# Patient Record
Sex: MALE | Race: WHITE | Employment: UNEMPLOYED | URBAN - METROPOLITAN AREA
[De-identification: names, ages, dates, MRNs, and addresses within clinical notes are randomized per-mention and may not be internally consistent; named-entity substitution may affect disease eponyms.]

---

## 2021-05-13 ENCOUNTER — APPOINTMENT (OUTPATIENT)
Dept: CT IMAGING | Age: 66
DRG: 063 | End: 2021-05-13
Payer: MEDICARE

## 2021-05-13 ENCOUNTER — HOSPITAL ENCOUNTER (OUTPATIENT)
Age: 66
Setting detail: SPECIMEN
Discharge: HOME | End: 2021-05-13
Payer: MEDICARE

## 2021-05-13 ENCOUNTER — HOSPITAL ENCOUNTER (INPATIENT)
Age: 66
LOS: 2 days | Discharge: HOME | DRG: 063 | End: 2021-05-15
Attending: EMERGENCY MEDICINE | Admitting: PSYCHIATRY & NEUROLOGY
Payer: MEDICARE

## 2021-05-13 DIAGNOSIS — Z92.82: Primary | ICD-10-CM

## 2021-05-13 DIAGNOSIS — I63.9 CEREBROVASCULAR ACCIDENT (CVA), UNSPECIFIED MECHANISM (HCC): ICD-10-CM

## 2021-05-13 LAB
% CKMB: 5.3 % (ref 0–3.5)
ABSOLUTE EOS #: 0.15 K/UL (ref 0–0.44)
ABSOLUTE IMMATURE GRANULOCYTE: <0.03 K/UL (ref 0–0.3)
ABSOLUTE LYMPH #: 1.15 K/UL (ref 1.1–3.7)
ABSOLUTE MONO #: 0.22 K/UL (ref 0.1–1.2)
ANION GAP SERPL CALCULATED.3IONS-SCNC: 13 MMOL/L (ref 9–17)
BASOPHILS # BLD: 1 % (ref 0–2)
BASOPHILS ABSOLUTE: <0.03 K/UL (ref 0–0.2)
BUN BLDV-MCNC: 21 MG/DL (ref 8–23)
BUN/CREAT BLD: ABNORMAL (ref 9–20)
CALCIUM SERPL-MCNC: 8.8 MG/DL (ref 8.6–10.4)
CHLORIDE BLD-SCNC: 104 MMOL/L (ref 98–107)
CK MB: 6.2 NG/ML
CKMB INTERPRETATION: ABNORMAL
CO2: 21 MMOL/L (ref 20–31)
CREAT SERPL-MCNC: 0.93 MG/DL (ref 0.7–1.2)
DIFFERENTIAL TYPE: ABNORMAL
EOSINOPHILS RELATIVE PERCENT: 5 % (ref 1–4)
GFR AFRICAN AMERICAN: >60 ML/MIN
GFR NON-AFRICAN AMERICAN: >60 ML/MIN
GFR SERPL CREATININE-BSD FRML MDRD: ABNORMAL ML/MIN/{1.73_M2}
GFR SERPL CREATININE-BSD FRML MDRD: ABNORMAL ML/MIN/{1.73_M2}
GLUCOSE BLD-MCNC: 92 MG/DL (ref 70–99)
HCT VFR BLD CALC: 42.1 % (ref 40.7–50.3)
HEMOGLOBIN: 13.8 G/DL (ref 13–17)
IMMATURE GRANULOCYTES: 1 %
INR BLD: 0.9
LYMPHOCYTES # BLD: 41 % (ref 24–43)
MCH RBC QN AUTO: 35.4 PG (ref 25.2–33.5)
MCHC RBC AUTO-ENTMCNC: 32.8 G/DL (ref 28.4–34.8)
MCV RBC AUTO: 107.9 FL (ref 82.6–102.9)
MONOCYTES # BLD: 8 % (ref 3–12)
MYOGLOBIN: 50 NG/ML (ref 28–72)
NRBC AUTOMATED: 0 PER 100 WBC
PARTIAL THROMBOPLASTIN TIME: 24.1 SEC (ref 20.5–30.5)
PDW BLD-RTO: 12.6 % (ref 11.8–14.4)
PLATELET # BLD: 171 K/UL (ref 138–453)
PLATELET ESTIMATE: ABNORMAL
PMV BLD AUTO: 9.6 FL (ref 8.1–13.5)
POTASSIUM SERPL-SCNC: 4.1 MMOL/L (ref 3.7–5.3)
PROTHROMBIN TIME: 9.7 SEC (ref 9.1–12.3)
RBC # BLD: 3.9 M/UL (ref 4.21–5.77)
RBC # BLD: ABNORMAL 10*6/UL
SARS-COV-2, RAPID: NOT DETECTED
SEG NEUTROPHILS: 44 % (ref 36–65)
SEGMENTED NEUTROPHILS ABSOLUTE COUNT: 1.23 K/UL (ref 1.5–8.1)
SODIUM BLD-SCNC: 138 MMOL/L (ref 135–144)
SPECIMEN DESCRIPTION: NORMAL
TOTAL CK: 117 U/L (ref 39–308)
TROPONIN INTERP: ABNORMAL
TROPONIN T: ABNORMAL NG/ML
TROPONIN, HIGH SENSITIVITY: 22 NG/L (ref 0–22)
WBC # BLD: 2.8 K/UL (ref 3.5–11.3)
WBC # BLD: ABNORMAL 10*3/UL

## 2021-05-13 PROCEDURE — 6360000004 HC RX CONTRAST MEDICATION: Performed by: STUDENT IN AN ORGANIZED HEALTH CARE EDUCATION/TRAINING PROGRAM

## 2021-05-13 PROCEDURE — 85730 THROMBOPLASTIN TIME PARTIAL: CPT

## 2021-05-13 PROCEDURE — 85025 COMPLETE CBC W/AUTO DIFF WBC: CPT

## 2021-05-13 PROCEDURE — A0427 ALS1-EMERGENCY: HCPCS

## 2021-05-13 PROCEDURE — 96376 TX/PRO/DX INJ SAME DRUG ADON: CPT

## 2021-05-13 PROCEDURE — 70450 CT HEAD/BRAIN W/O DYE: CPT

## 2021-05-13 PROCEDURE — 82553 CREATINE MB FRACTION: CPT

## 2021-05-13 PROCEDURE — 85610 PROTHROMBIN TIME: CPT

## 2021-05-13 PROCEDURE — 2000000003 HC NEURO ICU R&B

## 2021-05-13 PROCEDURE — 6360000002 HC RX W HCPCS: Performed by: STUDENT IN AN ORGANIZED HEALTH CARE EDUCATION/TRAINING PROGRAM

## 2021-05-13 PROCEDURE — 99283 EMERGENCY DEPT VISIT LOW MDM: CPT

## 2021-05-13 PROCEDURE — A0425 GROUND MILEAGE: HCPCS

## 2021-05-13 PROCEDURE — 87635 SARS-COV-2 COVID-19 AMP PRB: CPT

## 2021-05-13 PROCEDURE — 70496 CT ANGIOGRAPHY HEAD: CPT

## 2021-05-13 PROCEDURE — 93005 ELECTROCARDIOGRAM TRACING: CPT | Performed by: STUDENT IN AN ORGANIZED HEALTH CARE EDUCATION/TRAINING PROGRAM

## 2021-05-13 PROCEDURE — 96365 THER/PROPH/DIAG IV INF INIT: CPT

## 2021-05-13 PROCEDURE — 3E03317 INTRODUCTION OF OTHER THROMBOLYTIC INTO PERIPHERAL VEIN, PERCUTANEOUS APPROACH: ICD-10-PCS | Performed by: STUDENT IN AN ORGANIZED HEALTH CARE EDUCATION/TRAINING PROGRAM

## 2021-05-13 PROCEDURE — 99223 1ST HOSP IP/OBS HIGH 75: CPT | Performed by: PSYCHIATRY & NEUROLOGY

## 2021-05-13 PROCEDURE — 2500000003 HC RX 250 WO HCPCS: Performed by: STUDENT IN AN ORGANIZED HEALTH CARE EDUCATION/TRAINING PROGRAM

## 2021-05-13 PROCEDURE — 82565 ASSAY OF CREATININE: CPT

## 2021-05-13 PROCEDURE — 84484 ASSAY OF TROPONIN QUANT: CPT

## 2021-05-13 PROCEDURE — 83874 ASSAY OF MYOGLOBIN: CPT

## 2021-05-13 PROCEDURE — 96375 TX/PRO/DX INJ NEW DRUG ADDON: CPT

## 2021-05-13 PROCEDURE — 6360000002 HC RX W HCPCS: Performed by: EMERGENCY MEDICINE

## 2021-05-13 PROCEDURE — 82550 ASSAY OF CK (CPK): CPT

## 2021-05-13 PROCEDURE — 80048 BASIC METABOLIC PNL TOTAL CA: CPT

## 2021-05-13 PROCEDURE — 2580000003 HC RX 258: Performed by: STUDENT IN AN ORGANIZED HEALTH CARE EDUCATION/TRAINING PROGRAM

## 2021-05-13 RX ORDER — METHYLPREDNISOLONE SODIUM SUCCINATE 125 MG/2ML
125 INJECTION, POWDER, LYOPHILIZED, FOR SOLUTION INTRAMUSCULAR; INTRAVENOUS ONCE
Status: COMPLETED | OUTPATIENT
Start: 2021-05-13 | End: 2021-05-13

## 2021-05-13 RX ORDER — DIPHENHYDRAMINE HYDROCHLORIDE 50 MG/ML
25 INJECTION INTRAMUSCULAR; INTRAVENOUS ONCE
Status: COMPLETED | OUTPATIENT
Start: 2021-05-13 | End: 2021-05-13

## 2021-05-13 RX ORDER — SODIUM CHLORIDE 9 MG/ML
INJECTION, SOLUTION INTRAVENOUS CONTINUOUS
Status: DISCONTINUED | OUTPATIENT
Start: 2021-05-13 | End: 2021-05-15

## 2021-05-13 RX ORDER — 0.9 % SODIUM CHLORIDE 0.9 %
50 INTRAVENOUS SOLUTION INTRAVENOUS ONCE
Status: DISCONTINUED | OUTPATIENT
Start: 2021-05-13 | End: 2021-05-15 | Stop reason: HOSPADM

## 2021-05-13 RX ADMIN — METHYLPREDNISOLONE SODIUM SUCCINATE 125 MG: 125 INJECTION, POWDER, FOR SOLUTION INTRAMUSCULAR; INTRAVENOUS at 23:36

## 2021-05-13 RX ADMIN — SODIUM CHLORIDE 1000 ML: 9 INJECTION, SOLUTION INTRAVENOUS at 22:00

## 2021-05-13 RX ADMIN — DIPHENHYDRAMINE HYDROCHLORIDE 25 MG: 50 INJECTION, SOLUTION INTRAMUSCULAR; INTRAVENOUS at 23:36

## 2021-05-13 RX ADMIN — IOPAMIDOL 90 ML: 755 INJECTION, SOLUTION INTRAVENOUS at 23:22

## 2021-05-13 RX ADMIN — MIDAZOLAM HYDROCHLORIDE 2 MG: 1 INJECTION, SOLUTION INTRAMUSCULAR; INTRAVENOUS at 21:25

## 2021-05-13 RX ADMIN — ALTEPLASE 80.8 MG: KIT at 21:54

## 2021-05-13 RX ADMIN — MIDAZOLAM HYDROCHLORIDE 3 MG: 1 INJECTION, SOLUTION INTRAMUSCULAR; INTRAVENOUS at 21:30

## 2021-05-13 RX ADMIN — ALTEPLASE 9 MG: KIT at 21:53

## 2021-05-13 RX ADMIN — FAMOTIDINE 20 MG: 10 INJECTION, SOLUTION INTRAVENOUS at 23:36

## 2021-05-14 ENCOUNTER — APPOINTMENT (OUTPATIENT)
Dept: CT IMAGING | Age: 66
DRG: 063 | End: 2021-05-14
Payer: MEDICARE

## 2021-05-14 LAB
ABSOLUTE EOS #: 0.04 K/UL (ref 0–0.44)
ABSOLUTE IMMATURE GRANULOCYTE: 0 K/UL (ref 0–0.3)
ABSOLUTE LYMPH #: 0.46 K/UL (ref 1.1–3.7)
ABSOLUTE MONO #: 0.14 K/UL (ref 0.1–1.2)
ANION GAP SERPL CALCULATED.3IONS-SCNC: 15 MMOL/L (ref 9–17)
BASOPHILS # BLD: 0 % (ref 0–2)
BASOPHILS ABSOLUTE: 0 K/UL (ref 0–0.2)
BUN BLDV-MCNC: 18 MG/DL (ref 8–23)
BUN/CREAT BLD: ABNORMAL (ref 9–20)
CALCIUM SERPL-MCNC: 8.2 MG/DL (ref 8.6–10.4)
CHLORIDE BLD-SCNC: 108 MMOL/L (ref 98–107)
CHOLESTEROL/HDL RATIO: 2.7
CHOLESTEROL: 171 MG/DL
CO2: 14 MMOL/L (ref 20–31)
CREAT SERPL-MCNC: 0.66 MG/DL (ref 0.7–1.2)
DIFFERENTIAL TYPE: ABNORMAL
EKG ATRIAL RATE: 65 BPM
EKG Q-T INTERVAL: 400 MS
EKG QRS DURATION: 84 MS
EKG QTC CALCULATION (BAZETT): 419 MS
EKG R AXIS: 79 DEGREES
EKG T AXIS: 51 DEGREES
EKG VENTRICULAR RATE: 66 BPM
EOSINOPHILS RELATIVE PERCENT: 1 % (ref 1–4)
ESTIMATED AVERAGE GLUCOSE: 91 MG/DL
GFR AFRICAN AMERICAN: >60 ML/MIN
GFR NON-AFRICAN AMERICAN: >60 ML/MIN
GFR SERPL CREATININE-BSD FRML MDRD: ABNORMAL ML/MIN/{1.73_M2}
GFR SERPL CREATININE-BSD FRML MDRD: ABNORMAL ML/MIN/{1.73_M2}
GLUCOSE BLD-MCNC: 118 MG/DL (ref 70–99)
HBA1C MFR BLD: 4.8 % (ref 4–6)
HCT VFR BLD CALC: 45.3 % (ref 40.7–50.3)
HDLC SERPL-MCNC: 63 MG/DL
HEMOGLOBIN: 14.6 G/DL (ref 13–17)
IMMATURE GRANULOCYTES: 0 %
LDL CHOLESTEROL: 79 MG/DL (ref 0–130)
LV EF: 55 %
LVEF MODALITY: NORMAL
LYMPHOCYTES # BLD: 13 % (ref 24–43)
MCH RBC QN AUTO: 36 PG (ref 25.2–33.5)
MCHC RBC AUTO-ENTMCNC: 32.2 G/DL (ref 28.4–34.8)
MCV RBC AUTO: 111.6 FL (ref 82.6–102.9)
MONOCYTES # BLD: 4 % (ref 3–12)
MORPHOLOGY: ABNORMAL
NRBC AUTOMATED: 0 PER 100 WBC
PDW BLD-RTO: 12.6 % (ref 11.8–14.4)
PLATELET # BLD: 194 K/UL (ref 138–453)
PLATELET ESTIMATE: ABNORMAL
PMV BLD AUTO: 9.6 FL (ref 8.1–13.5)
POTASSIUM SERPL-SCNC: 4.8 MMOL/L (ref 3.7–5.3)
RBC # BLD: 4.06 M/UL (ref 4.21–5.77)
RBC # BLD: ABNORMAL 10*6/UL
SEG NEUTROPHILS: 82 % (ref 36–65)
SEGMENTED NEUTROPHILS ABSOLUTE COUNT: 2.86 K/UL (ref 1.5–8.1)
SODIUM BLD-SCNC: 137 MMOL/L (ref 135–144)
TRIGL SERPL-MCNC: 145 MG/DL
VLDLC SERPL CALC-MCNC: NORMAL MG/DL (ref 1–30)
WBC # BLD: 3.5 K/UL (ref 3.5–11.3)
WBC # BLD: ABNORMAL 10*3/UL

## 2021-05-14 PROCEDURE — 2580000003 HC RX 258: Performed by: STUDENT IN AN ORGANIZED HEALTH CARE EDUCATION/TRAINING PROGRAM

## 2021-05-14 PROCEDURE — 70491 CT SOFT TISSUE NECK W/DYE: CPT

## 2021-05-14 PROCEDURE — 92610 EVALUATE SWALLOWING FUNCTION: CPT

## 2021-05-14 PROCEDURE — 6360000002 HC RX W HCPCS: Performed by: PSYCHIATRY & NEUROLOGY

## 2021-05-14 PROCEDURE — 6370000000 HC RX 637 (ALT 250 FOR IP): Performed by: PSYCHIATRY & NEUROLOGY

## 2021-05-14 PROCEDURE — 2580000003 HC RX 258: Performed by: PSYCHIATRY & NEUROLOGY

## 2021-05-14 PROCEDURE — 99223 1ST HOSP IP/OBS HIGH 75: CPT | Performed by: PSYCHIATRY & NEUROLOGY

## 2021-05-14 PROCEDURE — 83036 HEMOGLOBIN GLYCOSYLATED A1C: CPT

## 2021-05-14 PROCEDURE — 80061 LIPID PANEL: CPT

## 2021-05-14 PROCEDURE — 6370000000 HC RX 637 (ALT 250 FOR IP): Performed by: STUDENT IN AN ORGANIZED HEALTH CARE EDUCATION/TRAINING PROGRAM

## 2021-05-14 PROCEDURE — 92523 SPEECH SOUND LANG COMPREHEN: CPT

## 2021-05-14 PROCEDURE — 6360000004 HC RX CONTRAST MEDICATION: Performed by: STUDENT IN AN ORGANIZED HEALTH CARE EDUCATION/TRAINING PROGRAM

## 2021-05-14 PROCEDURE — 93306 TTE W/DOPPLER COMPLETE: CPT

## 2021-05-14 PROCEDURE — 85025 COMPLETE CBC W/AUTO DIFF WBC: CPT

## 2021-05-14 PROCEDURE — 80048 BASIC METABOLIC PNL TOTAL CA: CPT

## 2021-05-14 PROCEDURE — 97165 OT EVAL LOW COMPLEX 30 MIN: CPT

## 2021-05-14 PROCEDURE — 94761 N-INVAS EAR/PLS OXIMETRY MLT: CPT

## 2021-05-14 PROCEDURE — 97535 SELF CARE MNGMENT TRAINING: CPT

## 2021-05-14 PROCEDURE — 2000000003 HC NEURO ICU R&B

## 2021-05-14 PROCEDURE — 36415 COLL VENOUS BLD VENIPUNCTURE: CPT

## 2021-05-14 RX ORDER — TOPIRAMATE 25 MG/1
50 TABLET ORAL 2 TIMES DAILY
Status: DISCONTINUED | OUTPATIENT
Start: 2021-05-14 | End: 2021-05-15 | Stop reason: HOSPADM

## 2021-05-14 RX ORDER — PRIMIDONE 250 MG/1
250 TABLET ORAL 2 TIMES DAILY
COMMUNITY

## 2021-05-14 RX ORDER — GAUZE BANDAGE 2" X 2"
100 BANDAGE TOPICAL DAILY
Status: DISCONTINUED | OUTPATIENT
Start: 2021-05-17 | End: 2021-05-15 | Stop reason: HOSPADM

## 2021-05-14 RX ORDER — MIDAZOLAM HYDROCHLORIDE 2 MG/2ML
2 INJECTION, SOLUTION INTRAMUSCULAR; INTRAVENOUS ONCE
Status: COMPLETED | OUTPATIENT
Start: 2021-05-14 | End: 2021-05-13

## 2021-05-14 RX ORDER — LISINOPRIL 10 MG/1
10 TABLET ORAL DAILY
COMMUNITY

## 2021-05-14 RX ORDER — SODIUM CHLORIDE 0.9 % (FLUSH) 0.9 %
5-40 SYRINGE (ML) INJECTION PRN
Status: DISCONTINUED | OUTPATIENT
Start: 2021-05-14 | End: 2021-05-15 | Stop reason: HOSPADM

## 2021-05-14 RX ORDER — SODIUM CHLORIDE 0.9 % (FLUSH) 0.9 %
5-40 SYRINGE (ML) INJECTION EVERY 12 HOURS SCHEDULED
Status: DISCONTINUED | OUTPATIENT
Start: 2021-05-14 | End: 2021-05-15 | Stop reason: HOSPADM

## 2021-05-14 RX ORDER — ATORVASTATIN CALCIUM 80 MG/1
80 TABLET, FILM COATED ORAL NIGHTLY
Status: DISCONTINUED | OUTPATIENT
Start: 2021-05-14 | End: 2021-05-15

## 2021-05-14 RX ORDER — LISINOPRIL 10 MG/1
10 TABLET ORAL DAILY
Status: DISCONTINUED | OUTPATIENT
Start: 2021-05-14 | End: 2021-05-15 | Stop reason: HOSPADM

## 2021-05-14 RX ORDER — FOLIC ACID 1 MG/1
1 TABLET ORAL DAILY
Status: DISCONTINUED | OUTPATIENT
Start: 2021-05-14 | End: 2021-05-15 | Stop reason: HOSPADM

## 2021-05-14 RX ORDER — ACETAMINOPHEN 325 MG/1
650 TABLET ORAL EVERY 6 HOURS PRN
Status: DISCONTINUED | OUTPATIENT
Start: 2021-05-14 | End: 2021-05-15 | Stop reason: HOSPADM

## 2021-05-14 RX ORDER — PROPRANOLOL HYDROCHLORIDE 40 MG/1
40 TABLET ORAL 3 TIMES DAILY
COMMUNITY

## 2021-05-14 RX ORDER — ACETAMINOPHEN 650 MG/1
650 SUPPOSITORY RECTAL EVERY 6 HOURS PRN
Status: DISCONTINUED | OUTPATIENT
Start: 2021-05-14 | End: 2021-05-15 | Stop reason: HOSPADM

## 2021-05-14 RX ORDER — POLYETHYLENE GLYCOL 3350 17 G/17G
17 POWDER, FOR SOLUTION ORAL DAILY PRN
Status: DISCONTINUED | OUTPATIENT
Start: 2021-05-14 | End: 2021-05-15 | Stop reason: HOSPADM

## 2021-05-14 RX ORDER — 0.9 % SODIUM CHLORIDE 0.9 %
1000 INTRAVENOUS SOLUTION INTRAVENOUS ONCE
Status: COMPLETED | OUTPATIENT
Start: 2021-05-14 | End: 2021-05-14

## 2021-05-14 RX ORDER — SODIUM CHLORIDE 9 MG/ML
25 INJECTION, SOLUTION INTRAVENOUS PRN
Status: DISCONTINUED | OUTPATIENT
Start: 2021-05-14 | End: 2021-05-15 | Stop reason: HOSPADM

## 2021-05-14 RX ORDER — TOPIRAMATE 25 MG/1
125 TABLET ORAL 2 TIMES DAILY
COMMUNITY
End: 2021-09-14 | Stop reason: ALTCHOICE

## 2021-05-14 RX ORDER — PROMETHAZINE HYDROCHLORIDE 12.5 MG/1
12.5 TABLET ORAL EVERY 6 HOURS PRN
Status: DISCONTINUED | OUTPATIENT
Start: 2021-05-14 | End: 2021-05-15 | Stop reason: HOSPADM

## 2021-05-14 RX ORDER — PROPRANOLOL HYDROCHLORIDE 40 MG/1
40 TABLET ORAL 3 TIMES DAILY
Status: DISCONTINUED | OUTPATIENT
Start: 2021-05-14 | End: 2021-05-15 | Stop reason: HOSPADM

## 2021-05-14 RX ORDER — MIDAZOLAM HYDROCHLORIDE 2 MG/2ML
3 INJECTION, SOLUTION INTRAMUSCULAR; INTRAVENOUS ONCE
Status: COMPLETED | OUTPATIENT
Start: 2021-05-13 | End: 2021-05-13

## 2021-05-14 RX ORDER — PRIMIDONE 250 MG/1
250 TABLET ORAL 2 TIMES DAILY
Status: DISCONTINUED | OUTPATIENT
Start: 2021-05-14 | End: 2021-05-15 | Stop reason: HOSPADM

## 2021-05-14 RX ORDER — ONDANSETRON 2 MG/ML
4 INJECTION INTRAMUSCULAR; INTRAVENOUS EVERY 6 HOURS PRN
Status: DISCONTINUED | OUTPATIENT
Start: 2021-05-14 | End: 2021-05-15 | Stop reason: HOSPADM

## 2021-05-14 RX ADMIN — PROPRANOLOL HYDROCHLORIDE 40 MG: 40 TABLET ORAL at 21:35

## 2021-05-14 RX ADMIN — TOPIRAMATE 50 MG: 25 TABLET, FILM COATED ORAL at 21:00

## 2021-05-14 RX ADMIN — PROPRANOLOL HYDROCHLORIDE 40 MG: 40 TABLET ORAL at 11:20

## 2021-05-14 RX ADMIN — LISINOPRIL 10 MG: 10 TABLET ORAL at 11:20

## 2021-05-14 RX ADMIN — SODIUM CHLORIDE, PRESERVATIVE FREE 10 ML: 5 INJECTION INTRAVENOUS at 11:21

## 2021-05-14 RX ADMIN — PRIMIDONE 250 MG: 250 TABLET ORAL at 21:00

## 2021-05-14 RX ADMIN — SODIUM CHLORIDE: 9 INJECTION, SOLUTION INTRAVENOUS at 02:35

## 2021-05-14 RX ADMIN — FOLIC ACID 1 MG: 1 TABLET ORAL at 11:20

## 2021-05-14 RX ADMIN — ATORVASTATIN CALCIUM 80 MG: 80 TABLET, FILM COATED ORAL at 21:32

## 2021-05-14 RX ADMIN — SODIUM CHLORIDE, PRESERVATIVE FREE 10 ML: 5 INJECTION INTRAVENOUS at 21:32

## 2021-05-14 RX ADMIN — THIAMINE HYDROCHLORIDE 500 MG: 100 INJECTION, SOLUTION INTRAMUSCULAR; INTRAVENOUS at 21:32

## 2021-05-14 RX ADMIN — TOPIRAMATE 50 MG: 25 TABLET, FILM COATED ORAL at 11:20

## 2021-05-14 RX ADMIN — PROPRANOLOL HYDROCHLORIDE 40 MG: 40 TABLET ORAL at 16:49

## 2021-05-14 RX ADMIN — IOPAMIDOL 75 ML: 755 INJECTION, SOLUTION INTRAVENOUS at 14:40

## 2021-05-14 RX ADMIN — SODIUM CHLORIDE: 9 INJECTION, SOLUTION INTRAVENOUS at 22:00

## 2021-05-14 RX ADMIN — THIAMINE HYDROCHLORIDE 500 MG: 100 INJECTION, SOLUTION INTRAMUSCULAR; INTRAVENOUS at 11:20

## 2021-05-14 RX ADMIN — PRIMIDONE 250 MG: 250 TABLET ORAL at 11:20

## 2021-05-14 ASSESSMENT — ENCOUNTER SYMPTOMS
CONSTIPATION: 0
ABDOMINAL PAIN: 0
CHEST TIGHTNESS: 0
COUGH: 0
VOMITING: 0
VOICE CHANGE: 1
TROUBLE SWALLOWING: 0
WHEEZING: 0
SHORTNESS OF BREATH: 0
PHOTOPHOBIA: 0
BACK PAIN: 0
DIARRHEA: 0
RHINORRHEA: 0
NAUSEA: 0

## 2021-05-14 ASSESSMENT — PAIN SCALES - GENERAL
PAINLEVEL_OUTOF10: 0
PAINLEVEL_OUTOF10: 0

## 2021-05-14 NOTE — VIRTUAL HEALTH
[facility] Stroke and Vascular Neurology Consult for  [facility] Stroke Unit Stroke Alert through [address] @ 916pm 5/13/2021 5/13/2021 9:29 PM    Pt Name: Sergei Patel  MRN: [id number]  YOB: 1955  Date of evaluation: 5/13/2021  Primary Care Physician: No primary care provider on file. Reason for Evaluation: Stroke Evaluation with Discussion with Ed or primary team with Telemedicine and stroke evaluation with Review of imaging and labs    73yo male with pmh of essential tremor, htn, pre dm. Pt presents with acute somnolence and mixed aphasia. At baseline pt has no focal deficits. LKn was 715pm 5/13/2021. Pt went to drink with friends. At around 754pm pt became somnolent, and was thought to be sleeping by friends. When the wife came to check on pt, he was hard to arouse, and had mixed aphasia, unable to follow commands. sbp 120, glu 135    Allergies  has no allergies on file. Medications  Prior to Admission medications    Not on File    Scheduled Meds:  Continuous Infusions:  PRN Meds:.  Past Medical History   has no past medical history on file.   Social History  Social History     Socioeconomic History    Marital status: Not on file     Spouse name: Not on file    Number of children: Not on file    Years of education: Not on file    Highest education level: Not on file   Occupational History    Not on file   Social Needs    Financial resource strain: Not on file    Food insecurity     Worry: Not on file     Inability: Not on file    Transportation needs     Medical: Not on file     Non-medical: Not on file   Tobacco Use    Smoking status: Not on file   Substance and Sexual Activity    Alcohol use: Not on file    Drug use: Not on file    Sexual activity: Not on file   Lifestyle    Physical activity     Days per week: Not on file     Minutes per session: Not on file    Stress: Not on file   Relationships    Social connections     Talks on phone: Not on file     Gets together: Not on file     Attends Religion service: Not on file     Active member of club or organization: Not on file     Attends meetings of clubs or organizations: Not on file     Relationship status: Not on file    Intimate partner violence     Fear of current or ex partner: Not on file     Emotionally abused: Not on file     Physically abused: Not on file     Forced sexual activity: Not on file   Other Topics Concern    Not on file   Social History Narrative    Not on file     Family History  No family history on file. OBJECTIVE  There were no vitals filed for this visit. General:   Gen: normal habitus, NAD  HEENT: NCAT, mucosa moist  Cvs: RRR, S1 S2 normal  Resp: symmetric unlabored breathing  Abd: s/nd/nt  Ext: no edema  Skin: no lesions seen, warm and dry    Neuro: exam performed after 5mg versed was given. Gen: obtunded, not oriented. Lang/speech: mute. No commands. CN: PERRL 4mm brisk, oculocephalics intact, blink to threat, mild L face droop  Motor: 2/5 all 4 ext, some voluntary spontaneous movement ue b/l. Sense: w/d and grimace to pain all 4 ext. Coord: deferred  DTR: deferred  Gait: deferred    NIH Stroke Scale:   1a  Level of consciousness: 3 - responds only with reflex motor or automatic effects or totally unresponsive, flaccid, areflexic   1b. LOC questions:  2 - answers neither question correctly   1c. LOC commands: 2 - performs neither task correctly   2. Best Gaze: 0 - normal   3. Visual: 0 - no visual loss   4. Facial Palsy: 1 - minor paralysis (flattened nasolabial fold, asymmetric on smiling)   5a. Motor left arm: 3 - no effort against gravity, limb falls   5b. Motor right arm: 3 - no effort against gravity, limb falls   6a. Motor left leg: 3 - no effort against gravity; leg falls to bed immediately   6b  Motor right leg:  3 - no effort against gravity; leg falls to bed immediately   7. Limb Ataxia: 0 - absent   8.   Sensory: 0 - normal; no sensory loss 9. Best Language:  3 - mute, global aphasia; no usable speech or auditory comprehension   10. Dysarthria: 2 - severe; patient speech is so slurred as to be unintelligible in the absence of or our of proportion to any dysphagia, or is mute/anarthric    11. Extinction and Inattention: 0 - no abnormality         Total:   26     Premorbid MRS: 0      Imaging:  Images were personally reviewed with VIZ. AI used to review images including:  CT brain without contrast: no large territory hypodensity or bleed    Assessment  71yo male with pmh of essential tremor, htn, pre dm. Pt presents with acute somnolence and mixed aphasia. CT head no bleed or large hypodensity. Pt obtunded after versed. Concern for acute cortical stroke given rapid onset. Wife Mayte Melissa [phone] updated on treatment and findings. Recommendations:  --tpa given. --on arrival to [facility], repeat CT head w con and check CTA head and neck w con. consider mt if there is lvo.  --sbp < 185  --bolus 1L NS ivf then continue 110 cc/hr    Discussed with Stroke team    At least 30 min of critical care time spent through telemedicine robot at patient bedside (via interactive/real-time software) as patient is in imminent and life threatening deterioration with further treatment and evaluation. This Virtual Visit was conducted with patient's (and/or legal guardian's) consent, to provide telestroke consultation and necessary medical care.   Time spent examining patient, reviewing the images personally, reviewing the chart, perform high complexity decision making and speaking with the nursing staff regarding recommendations      Aislinn Galvan MD PhD  Stroke, Neurocritical Care And/or [address] [place] [address] Stroke Network  [id number] [address]  Electronically signed 5/13/2021 at 9:29 PM

## 2021-05-14 NOTE — PROGRESS NOTES
Speech Language Pathology  [address]   CLINICAL BEDSIDE SWALLOW EVALUATION    NAME: Ruchi Hatfield  : 1955  MRN: [id number]    ADMISSION DATE: 2021  ADMITTING DIAGNOSIS: has Stroke aborted by administration of thrombolytic agent [facility]) on their problem list.    Date of Eval: 2021  Evaluating Therapist: Bailey Koroma    Current Diet level:  Current Diet : NPO  Current Liquid Diet : NPO      Primary Complaint: The patient is a 77 y.o. male presented with a slumped posture and poorly responsive at 7:45 PM while having drinks with friends. Last known well was 7:15 PM.  Wife went to check on him when he was poorly responsive EMS was called, and on their arrival he was combative requiring 5 mg Versed. Mobile stroke scanned his head showing no bleed. Decision to give TPA delivered at 10:45 PM.  In the emergency department patient got repeat CT head and CTA. On ED arrival NIH 4, left facial droop, severe aphasia, dysarthria. Given dysarthria and aphasia HP and past medical history extremely limited. No emergency contact or wife at bedside to answer questions. Laboratory work unremarkable. Patient on 125 mL/h NS. Pain:  Pain Assessment  RASS Score: Alert and calm    Reason for Referral  Ruchi Hatfield was referred for a bedside swallow evaluation to assess the efficiency of his swallow function, identify signs and symptoms of aspiration and make recommendations regarding safe dietary consistencies, effective compensatory strategies, and safe eating environment. Impression: Patient presents with probable safe swallow for Regular diet with thin liquids as evidenced by no overt s/s of aspiration noted with consistencies tested. Recommend small sips and bites, only feed when alert and awake and upright at 90 degrees for all PO intake.   Recommend close monitoring for overt/clinical s/s of aspiration and D/C PO intake and complete Modified Barium Swallow Study should they occur. Results and recommendations reported to RN. Dysphagia Outcome Severity Scale: Level 7: Normal in all situations     Treatment Plan  Requires SLP Intervention: Yes  Duration/Frequency of Treatment: 2-3X/week  D/C Recommendations: To be determined       Recommended Diet and Intervention  Diet Solids Recommendation: Regular  Liquid Consistency Recommendation: Thin  Recommended Form of Meds: PO     Therapeutic Interventions: Diet tolerance monitoring    Compensatory Swallowing Strategies  Compensatory Swallowing Strategies: Alternate solids and liquids;Eat/Feed slowly;Upright as possible for all oral intake;Small bites/sips    Treatment/Goals  Dysphagia Goals: The patient will tolerate recommended diet without observed clinical signs of aspiration    General  Chart Reviewed: Yes  Behavior/Cognition: Alert; Cooperative  Respiratory Status: Room air  Communication Observation: Functional  Follows Directions: Complex  Dentition: Adequate  Patient Positioning: Upright in bed  Baseline Vocal Quality: Normal(Harsh)  Consistencies Administered: Dysphagia Soft and Bite-Sized (Dysphagia III); Dysphagia Pureed (Dysphagia I); Thin - straw;Nectar - teaspoon;Nectar - straw      Vision/Hearing  Vision  Vision: Within Functional Limits  Hearing  Hearing: Within functional limits    Oral Motor Deficits  Oral/Motor  Oral Motor:  Within functional limits    Oral Phase Dysfunction  Oral Phase  Oral Phase: WFL  Oral Phase  Oral Phase - Comment: Adequate oral manipulation for consistencies, no oral loss, no oral stasis     Indicators of Pharyngeal Phase Dysfunction   Pharyngeal Phase  Pharyngeal Phase: WFL  Pharyngeal Phase   Pharyngeal: Thin, Thick, Puree and Fruit:  No overt s/s of aspiration noted with consistencies tested    Prognosis  Prognosis  Prognosis for safe diet advancement: good  Individuals consulted  Consulted and agree with results and recommendations: Patient    Education  Patient Education: yes  Patient Education Response: Verbalizes understanding             Therapy Time  SLP Individual Minutes  Time In: 6345  Time Out: 9635  Minutes: 60[address]  5/14/2021 11:24 AM

## 2021-05-14 NOTE — ED NOTES
..    5/14/2021 12:06 AM    Patient: Duane Fernando, 77 y.o., male Race:    Patient Address: No address on file. Incident Address:  []Same as patient address     [] [facility]  [] [facility]  [x] [facility]   [] [place]  [] [facility]  Patient Phone #: There are no phone numbers on file. Insurance: Payor: /   Worker's Comp:  []  Yes   [x]  No  Emergency Contact: No emergency contact information on file. MRN: [id number]   # [id number]  YOB: 1955  Primary Care Physician: No primary care provider on file. Advance Directive: [x] Full Code   []DNR-CC   []DNR-CCA    MSU Crew: LAINEY Lau Son - CT Tech, TYRELL Carpenter - Paramedic, CELIA Teixeira - RN    Pt Transported To:  [x] [place] [address]  [] [facility]  [] [facility]  [] [facility]  [] [facility]  [] [facility]  [facility] [] [facility] [] [facility]    Was patient transported to closest facility? []Yes  [x]No (if No specify reason)   []   Patient/family request    [x]   Divert to specialist       Response Code   [] 2  [x] 3  []   Change  [] 2  [] 3   Transport Code   [x] 2  [] 3  []   Change  [] 2  [] 3     Mileage:  Scene [address]   Total Miles 29.1     Call Received 5/14/2021 2035   Dispatched 5/14/2021 2035   Enroute 5/14/2021 2036   Arrived Scene 5/14/2021 2107   At Patient 5/14/2021 2110   Decision to Scan 5/14/2021 2110   Scan 5/14/2021 2127   Departed Scene 5/14/2021 2150   Arrived Hospital 5/14/2021 [address] 5/14/2021 2325   In Service 5/14/2021 2325         Allergies  [] Updated/Reviewed by MSU  [x] Historical Data Only  [] Unavailable  has no allergies on file. Medications  [] Updated/Reviewed by MSU  [x] Historical Data Only  [] Unavailable  Prior to Admission medications    Not on File      Past Medical History  [] Updated/Reviewed by MSU  [x] Historical Data Only  [] Unavailable   has no past medical history on file.     Patient Weight: 220 lbs   [x]   Estimated   []   Stated          VITALS          Time BP Pulse   Resp  Rate N-normal  S-shallow  D-deep Monitor SpO2 O2    LPM BGL   Temp Pain   2115 122/78 (Per [facility]) 67 22 S NSR 98 0 135 37 0   2130 UTO 65 18 S NSR 98 0      2145 UTO 67 14 D NSR 99 5      2200 110/80 66 12 D NSR  97 5      2215 118/78 69 10 D NSR 98 5      2219 124/78 71 14 N NSR 99 5      2229 136/81 77 16 N NSR 98 5                                   Pupils GCS   Time R L E  4 V  5 M  6 T  15   2115 2 2 4 2 5 11   2130 4 4 4 2 5 11   2145 4 4 1 2 5 8   2200 4 4 1 2 5 8   2215 3 3 3 4 6 13   2229 3 3 3 5 6 14                                  NIH -   record on all transports and Q15 min after tPA administration                                       NIHSS       Time 2125 2353 2208 2223   1a. LOC - Arousal Status 1 2 2 0   1b. LOC - Questions 2 2 2 0   1c. LOC - Commands 2 2 2 0   2. Eye Movements (gaze) 0 0 0 0   3. Visual Fields 0 0 0 0   4. Facial Palsy 1 1 1 1   5a. Motor Left Ar 0UTA  0UTA 0UTA 0   5b. Motor Right Arm 0UTA 0UTA 0UTA 0   6a. Motor Left Leg 0UTA 0UTA 0UTA 1   6b. Motor Right Leg 0UTA 0UTA 0UTA 1   7. Limb Ataxia 0 0 0 0   8. Sensory 0UTA 0UTA 0UTA 0   9. Language/Aphasia 3 3 3 1   10. Dysarthria 0 0 0 1   11. Neglect 0 0 0 0   TOTAL 9 10 10 5       Research:    RACE Score: 3 Time: 2135  StrokeVAN Score: positive Time: 2135    Time Last Known Well: 1915    Narrative:    Dispatched to possible CVA per LF. Arrived to find Tirso, 77 y.o., male c/o sudden onset stroke like symptoms. Report received from [facility] at patients side. Per EMS, Patient R Marianne Barnett 115 was [phone] per family and was found outside sitting in a chair and was unresponsive. Upon their arrival, patient was globally aphasic, with a facial droop and became combative on the way to meet with MSU. Upon patient arrival, patient was found to be awake but unable to speak. Patient not following commands at that time. Patient could occasionally get one intelligible word out. Patient noted to have mild left facial droop.  Patient arms very spastic and drawn up towards his chest, while occasionally he could pull his arm up in an attempt to swing upwards. Decision made to scan. Patient increasingly agitated, making incomprehensible noises, so the decision to give IV versed was made. Initial dose of 2 mg given IV at 2125, with second dose given at 2130 in attempt to scan. Patient able to be scanned after.  at patient side via telemedicine unit. Patient became somulent at this time so 5L NC was applied along with the initiation of Co2 monitoring. While assessing, it was noted that left arm and leg had lesser response to deep pain then the right. Due to negative CT scan and patient presentation, tPa was initiated per Dr. Donald Francois. Patient received bolus dose at 2153, pushed by [facility], Paramedic. Drip then was initiated at 2154 by Rand Monzon. While en rout to [facility], patient began to become more arousable. Upon further assessment, about 20 minutes after the initiation of tPa, patient was able to respond to commands, give his name, wife's name, and his age. Patient also able to give a thumbs up and move all extremities. Patient received the following medications prior to MSU arrival:None  Patient has the following lines prior to MSU arrival:[facility]  Patient has the following airway placed prior to MSU arrival: None    Patient was transferred to cot via 2 person assist and secured with straps x3. Zoll ECG, SpO2, and NIBP applied and monitored throughout encounter. HOB maintained at 30 degrees. Patient was transferred to ambulance, cot secured in scan position. Non contrast CT scan performed. After scan cot secured in transport position. IV tPA inclusion/exclusion criteria reviewed with patient and physician. Candidate for IV tPA therapy?   [x] Yes   [] No - due to the following exclusion criteria:    [] ICH   [] Taking anticoagulant -   [] Beyond last time known well window  [] At or returned to baseline   [] Marked improvement of symptoms  [] No disabling symptoms  [] Other -     Patient is being [facility] to [facility] . During transport patient rests comfortably. Cabin temp maintained at 70-72 °F throughout transport. Patient was transferred to bed 13 via 4 person sheet lift. . Patient care handoff completed with Carrington Cruz RN at bedside. Imaging:  Images were obtained onboard the MSU including:  [x] CT brain without contrast - see results below:      Ct Head Wo Contrast    Result Date: 5/13/2021  EXAMINATION: CT OF THE HEAD WITHOUT CONTRAST  5/13/2021 11:03 pm TECHNIQUE: CT of the head was performed without the administration of intravenous contrast. Dose modulation, iterative reconstruction, and/or weight based adjustment of the mA/kV was utilized to reduce the radiation dose to as low as reasonably achievable. COMPARISON: Head CT 05/13/2021 HISTORY: ORDERING SYSTEM PROVIDED HISTORY: r/o stroke, s/p TPA TECHNOLOGIST PROVIDED HISTORY: r/o stroke, s/p TPA Decision Support Exception - unselect if not a suspected or confirmed emergency medical condition->Emergency Medical Condition (MA) Reason for Exam: stroke alert, post tpa Acuity: Acute Type of Exam: Initial FINDINGS: BRAIN/VENTRICLES: There is no acute intracranial hemorrhage, mass effect or midline shift. No abnormal extra-axial fluid collection. The gray-white differentiation is maintained without evidence of an acute infarct. There is no evidence of hydrocephalus. Intracranial vascular calcifications. ORBITS: The visualized portion of the orbits demonstrate no acute abnormality. SINUSES: The visualized paranasal sinuses and mastoid air cells demonstrate no acute abnormality. SOFT TISSUES/SKULL:  No acute abnormality of the visualized skull or soft tissues. No acute intracranial abnormality. No hemorrhagic conversion status post tPA.      Ct Head Wo Contrast    Result Date: 5/13/2021  EXAMINATION: CT OF THE HEAD WITHOUT CONTRAST  5/13/2021 9:17 pm TECHNIQUE: CT of the head was performed without the administration of intravenous contrast. Dose modulation, iterative reconstruction, and/or weight based adjustment of the mA/kV was utilized to reduce the radiation dose to as low as reasonably achievable. COMPARISON: None. HISTORY: ORDERING SYSTEM PROVIDED HISTORY: Stroke TECHNOLOGIST PROVIDED HISTORY: Stroke Decision Support Exception - unselect if not a suspected or confirmed emergency medical condition->Emergency Medical Condition (MA) FINDINGS: Exam performed on a mobile/portable CT scan. Only axial images are available for review. BRAIN/VENTRICLES: Motion and attenuation artifact challenge evaluation. There is no acute intracranial hemorrhage, mass effect or midline shift. No abnormal extra-axial fluid collection. The gray-white differentiation is grossly maintained without evidence of an acute infarct. There is no evidence of hydrocephalus. ORBITS: The visualized portion of the orbits demonstrate no acute abnormality. SINUSES: The visualized paranasal sinuses and mastoid air cells demonstrate no acute abnormality. SOFT TISSUES/SKULL:  No acute abnormality of the visualized skull or soft tissues. No acute bleed or midline shift. The findings were sent to [employer] electronically at 9:47 pm on 5/13/2021       Physical assessment performed:    Neuro: Initially globally aphasic, unable to follow commands, upon patient leaving MSU, patient able to follow commands and was alert and oriented x2. Able to move arms and legs equally. Resp: lungs clear to auscultation bilaterally, normal effort. 5 L NC applied, co2 noted to maintain around 30-35. Cardiac: regular rate, no murmur, 12 lead ECG shows NSR  Muscular/skeletal/peripheral vascular: no edema, no redness or tenderness in the calves, pulses present in 4 extremities   Abd/GI/: abd flat, soft, non tender.   Skin: normal color, warm, dry      IV LINES   Time Size Site # Attempts Performed By   Deon Wei 49 Ruelvira Cortés 2155 20 RAC 1 TYRELL Carpenter, Medic              Total Amount of IV Fluids Infused: 200    MEDS / ELECTRICAL RX   Time Therapy Dosage Route Response Performed By   2125 Versed 2 IV No response CELIA Teixeira RN   2130 Versed 3 IV Decreased agitation CELIA Teixeira RN                                       TPA Administration   Time Bolus Dose Infusion Dose   2153 9      81       [x] tPA dosing double checked by:  TYRELL Carpenter, Paramedic       ACUTE STROKE DYSPHAGIA SCREEN              YES NO   1 GCS less than 13?         2 Facial Asymmetry or Weakness? 3 Tongue Asymmetry or Weakness? 4 Palatal Asymmetry or Weakness? 5 Signs of aspiration during 3oz water test?*                 If answers to questions 1-4 are NO proceed to 3oz water test               *Administer 3oz of water for sequential drinks, note any throat clearing, cough, or change in vocal quality immediately after and 1 minute following the swallow.                If answers to questions 1-5 are NO proceed with ordered PO meds       POC LABS      TIME      Test (reference range)      FSBG ()      PT (11-13.5)      INR (0.8-1.1)      Na (138-146)      K (3.5-4.9)      Cl ()      iCa (1.2-1.32)      TCO2 (24-29)      Glu () 135     BUN (8.0-26)      Crea (0.6-1.3) 1.3     Hct (38-51)      Hb (12.0-17)      AnGap 10.0-20)                Assistance:   []    Fire -     []    Police    [x]    Other EMS (See Below)    [facility] Notification:    Angelito Coleman 15 -  [x] [facility]  [] [facility]  [] [facility]  [] [facility]  [] [facility] [] [facility] [] [facility] [] [facility]  [] [employer]     [x]    Med Channel:     []    Cell Phone     Med Control Orders Received:   [] No  [x]  Yes:     Med Control Physician (if on line medical direction received)  -        Hospital Team Alert:   []    Trauma Alert    []    STEMI Alert         []    Stroke Alert    [x]    RACE Alert      Description Of Valuables: Shirts, shoes, jeans    Patient Valuables:   [x]    With Patient    []    Not Received    [x]    ER / Lab     Call Outcome:   [x]    Transport to Facility    []    Care Transferred to [facility]    []    Cancelled    []    Patient Refusal    []                           Mobile Stroke Unit    Electronically signed by Diana Guerrero RN on 5/14/21 at 12:06 AM EDT       Diana Guerrero RN  05/14/21 6087

## 2021-05-14 NOTE — CONSULTS
Department of stroke/Endovascular Neurology                                           Resident Consult Note                                                     Paged at: 10:25 PM                                                     Arrived at: 10:30 PM  ED bed: 13  Reason for Consult: Severe aphasia, left facial droop. Stroke attending:  Dr Josh Echevarria  Endovascular Neurosurgeon:   []Dr. Sofia Fay  [x]Dr. Ariadne Amado    History Obtained From:  patient, spouse, electronic medical record, medical staff    CHIEF COMPLAINT:       Severe expressive aphasia, left facial droop    HISTORY OF PRESENT ILLNESS:       The patient is a 77 y.o. male possible history of hypertension, prediabetes, MI (per patient report, who presents with severe expressive aphasia and left facial droop. Last known well 7:15 PM.  He is on aspirin 81 mg daily. NIHSS of 5 as described below. Initial systolic blood pressure was 160. Blood sugar was normal limits. Per spouse report, the patient was sitting and he was drinking alcohol when he suddenly was unresponsive, then he regained his consciousness and was not able to get the words out of his mouth. EMS was called. CT head was done in the mobile stroke unit and was unremarkable for acute changes. Stroke team decided to start TPA. In the ED, the patient was alert, has severe aphasia with NIHSS of 5. Mild left facial droop. Repeated CT head was unremarkable for acute changes CTA was unremarkable for LVO or stenosis. The patient will be admitted for stroke work-up in the ICU. Patient started to have some muffled speech with shortness of breath, ED physician decided to give him Solu-Medrol, Pepcid, to rule out possible angioedema. NIH Stroke Scale Total (if not done complete detailed one below):    1a.  Level of consciousness:  0 - alert; keenly responsive  1b. Level of consciousness questions:  0 - answers both questions correctly  1c.   Level of consciousness questions:  0 - performs both tasks correctly  2. Best Gaze:  0 - normal  3. Visual:  0 - no visual loss  4. Facial Palsy:  1 - minor paralysis (flattened nasolabial fold, asymmetric on smiling)  5a. Motor left arm:  0 - no drift, limb holds 90 (or 45) degrees for full 10 seconds  5b. Motor right arm:  0 - no drift, limb holds 90 (or 45) degrees for full 10 seconds  6a. Motor left le - no drift; leg holds 30 degree position for full 5 seconds  6b. Motor right le - no drift; leg holds 30 degree position for full 5 seconds  7. Limb Ataxia:  0 - absent  8. Sensory:  0 - normal; no sensory loss  9. Best Language:  2 - severe aphasia; all communication is through fragmentary expression; great need for inference, questioning, and guessing by the listener. Range of information that can be exchanged is limited; listener carries burden of communication. Examiner cannot identify materials provided from patient response. 10.  Dysarthria:  2 - severe; patient speech is so slurred as to be unintelligible in the absence of or our of proportion to any dysphagia, or is mute/anarthric   11. Extinction and Inattention:  0 - no abnormality    Modified Kimball Score Scale:     [x] Zero: No symptoms at all   [] 1: No significant disability despite symptoms; able to carry out all usual duties and activities   [] 2: Slight disability; unable to carry out all previous activities, but able to look after own affairs without assistance   [] 3:Moderate disability; requiring some help, but able to walk without assistance   [] 4: Moderately severe disability; unable to walk and attend to bodily needs without assistance   [] 5:Severe disability; bedridden, incontinent and requiring constant nursing care and attention         PAST MEDICAL HISTORY :       Past Medical History:    No past medical history on file. Past Surgical History:    No past surgical history on file.     Social History:   Social History Socioeconomic History    Marital status: Not on file     Spouse name: Not on file    Number of children: Not on file    Years of education: Not on file    Highest education level: Not on file   Occupational History    Not on file   Social Needs    Financial resource strain: Not on file    Food insecurity     Worry: Not on file     Inability: Not on file    Transportation needs     Medical: Not on file     Non-medical: Not on file   Tobacco Use    Smoking status: Not on file   Substance and Sexual Activity    Alcohol use: Not on file    Drug use: Not on file    Sexual activity: Not on file   Lifestyle    Physical activity     Days per week: Not on file     Minutes per session: Not on file    Stress: Not on file   Relationships    Social connections     Talks on phone: Not on file     Gets together: Not on file     Attends Orthodoxy service: Not on file     Active member of club or organization: Not on file     Attends meetings of clubs or organizations: Not on file     Relationship status: Not on file    Intimate partner violence     Fear of current or ex partner: Not on file     Emotionally abused: Not on file     Physically abused: Not on file     Forced sexual activity: Not on file   Other Topics Concern    Not on file   Social History Narrative    Not on file       Family History:   No family history on file. Allergies:  Patient has no allergy information on record.     Home Medications:  Prior to Admission medications    Not on File       Current Medications:   Current Facility-Administered Medications: [COMPLETED] alteplase (ACTIVASE) injection 9 mg, 0.09 mg/kg, Intravenous, Once **FOLLOWED BY** [COMPLETED] alteplase (ACTIVASE) injection 80.8 mg, 0.81 mg/kg, Intravenous, Once **FOLLOWED BY** 0.9 % sodium chloride bolus, 50 mL, Intravenous, Once  0.9 % sodium chloride infusion, , Intravenous, Continuous    REVIEW OF SYSTEMS:       CONSTITUTIONAL: negative for fatigue and malaise   EYES: negative for double vision and photophobia    HEENT: negative for tinnitus and sore throat   RESPIRATORY: negative for cough, shortness of breath   CARDIOVASCULAR: negative for chest pain, palpitations   GASTROINTESTINAL: negative for nausea, vomiting   GENITOURINARY: negative for incontinence   MUSCULOSKELETAL: negative for neck or back pain   NEUROLOGICAL:  Left facial droop. Expressive aphasia. PSYCHIATRIC: negative for fatigue     Review of systems otherwise negative.     PHYSICAL EXAM:       /70   Pulse 65   Temp 97.4 °F (36.3 °C) (Axillary)   Resp 20   Wt 220 lb (99.8 kg)   SpO2 99%     NEUROLOGIC EXAMINATION  GENERAL  Appears comfortable and in no distress   HEENT  NC/ AT   cardiovascular  S1 and S2 heard; palpation of pulses: radial pulse    NECK  Supple and no bruits heard   MENTAL STATUS:  Alert, oriented, intact memory, no confusion, severe expressive aphasia, dysarthria,, no hallucination or delusion   CRANIAL NERVES: II     -      PERRL  Visual fields intact to confrontation  III,IV,VI -  EOMs full, no afferent defect, no LAURA, no ptosis  V     -     Normal facial sensation   VII    -     mild left facial droop  VIII   -     Intact hearing   IX,X -     Symmetrical palate  XI    -     Symmetrical shoulder shrug  XII   -     Midline tongue, no atrophy    MOTOR FUNCTION:  significant for good strength of grade 5/5 in bilateral proximal and distal muscle groups of both upper and lower extremities with normal bulk, normal tone and no involuntary movements, no tremor   SENSORY FUNCTION:  Normal touch, normal pin, normal vibration, normal proprioception   CEREBELLAR FUNCTION:  Intact fine motor control over upper limbs   REFLEX FUNCTION:  Symmetric, no perverted reflex, no Babinski sign   STATION and GAIT  Not tested       LABS AND IMAGING:     CBC with Differential:    Lab Results   Component Value Date    WBC 2.8 05/13/2021    RBC 3.90 05/13/2021    HGB 13.8 05/13/2021    HCT 42.1 05/13/2021  05/13/2021    .9 05/13/2021    MCH 35.4 05/13/2021    MCHC 32.8 05/13/2021    RDW 12.6 05/13/2021    LYMPHOPCT 41 05/13/2021    MONOPCT 8 05/13/2021    BASOPCT 1 05/13/2021    MONOSABS 0.22 05/13/2021    LYMPHSABS 1.15 05/13/2021    EOSABS 0.15 05/13/2021    BASOSABS <0.03 05/13/2021    DIFFTYPE NOT REPORTED 05/13/2021     BMP:    Lab Results   Component Value Date     05/13/2021    K 4.1 05/13/2021     05/13/2021    CO2 21 05/13/2021    BUN 21 05/13/2021    CREATININE 0.93 05/13/2021    CALCIUM 8.8 05/13/2021    GFRAA >60 05/13/2021    LABGLOM >60 05/13/2021    GLUCOSE 92 05/13/2021       Radiology Review:  CTH  CTA h&N  MRI brain  MRA H/N      ASSESSMENT AND PLAN:       Patient Active Problem List   Diagnosis    Stroke aborted by administration of thrombolytic agent ([facility] [address]         1. Last Known Well (date and time): 7:15 PM    2. Candidate for IV tPA therapy     Yes [x]      No  [] due to the following exclusion criteria:     3. Candidate for Thrombectomy    Yes []      No [x] due to the following exclusion criteria: No LVO      The patient is a 77 y.o. male possible history of hypertension, prediabetes, MI (per patient report, who presents with severe expressive aphasia and left facial droop. Last known well 7:15 PM.  He is on aspirin 81 mg daily. NIHSS of 5 for expressive aphasia and left facial droop. Initial systolic blood pressure was 160. Blood sugar was normal limits. S/p TPA. - [facility] WO unremarkable for acute changes  - CTA H/N unremarkable   - MRI Brain WO   - ECHO   - Hold on anticoagulation and antiplatelets for 24 hours post TPA. - Folic acid 1mg BID   - Lipitor 80mg nightly    - Fasting Lipid panel   - PT, OT, Speech eval    - Hydrate with 500 cc bolus then IVF NS @ 100cc/hr    - Telemetry    - Neuro checks per protocol  - We recommend SBP less than 180/105.   - Blood glucose goal less than 180  - Please avoid dextrose containing solutions              - Discussed with Dr Segun Cabrera      Additional recommendations may follow  Please contact [facility] with any changes in patients neurologic status. Thank you for your consult.        [address] Maru Mercado MD   5/14/2021  12:15 AM

## 2021-05-14 NOTE — ED NOTES
Bed: 13  Expected date:   Expected time:   Means of arrival:   Comments:  [address], RN  05/13/21 [id number]

## 2021-05-14 NOTE — H&P
Neuro ICU History & Physical    Patient Name: Mariano Vallejo  Patient : 1955  Room/Bed:   Code Status: full  Allergies: Not on File    CHIEF COMPLAINT     Stroke alert    HPI    History Obtained From: EMR patient    The patient is a 77 y.o. male presented with a slumped posture and poorly responsive at 7:45 PM while having drinks with friends. Last known well was 7:15 PM.  Wife went to check on him when he was poorly responsive EMS was called, and on their arrival he was combative requiring 5 mg Versed. Mobile stroke scanned his head showing no bleed. Decision to give TPA delivered at 10:45 PM.  In the emergency department patient got repeat CT head and CTA. On ED arrival NIH 4, left facial droop, severe aphasia, dysarthria. Given dysarthria and aphasia HP and past medical history extremely limited. No emergency contact or wife at bedside to answer questions. Laboratory work unremarkable. Patient on 125 mL/h NS.        Admitted to ICU From: Emergency department  Reason for ICU Admission: Stroke status post TPA       PATIENT HISTORY   Past Medical History:    Unable to obtain no family at bedside no history in EMR     Past Surgical History:    Unable to obtain no family bedside and no history EMR    Social History:   Social History     Socioeconomic History    Marital status: Not on file     Spouse name: Not on file    Number of children: Not on file    Years of education: Not on file    Highest education level: Not on file   Occupational History    Not on file   Social Needs    Financial resource strain: Not on file    Food insecurity     Worry: Not on file     Inability: Not on file    Transportation needs     Medical: Not on file     Non-medical: Not on file   Tobacco Use    Smoking status: Not on file   Substance and Sexual Activity    Alcohol use: Not on file    Drug use: Not on file    Sexual activity: Not on file   Lifestyle    Physical activity     Days per week: Not on file Minutes per session: Not on file    Stress: Not on file   Relationships    Social connections     Talks on phone: Not on file     Gets together: Not on file     Attends Anglican service: Not on file     Active member of club or organization: Not on file     Attends meetings of clubs or organizations: Not on file     Relationship status: Not on file    Intimate partner violence     Fear of current or ex partner: Not on file     Emotionally abused: Not on file     Physically abused: Not on file     Forced sexual activity: Not on file   Other Topics Concern    Not on file   Social History Narrative    Not on file       Family History:   No family history on file. Allergies:    Patient has no allergy information on record. Medications Prior to Admission:    Not in a hospital admission. Current Medications:  Current Facility-Administered Medications: [COMPLETED] alteplase (ACTIVASE) injection 9 mg, 0.09 mg/kg, Intravenous, Once **FOLLOWED BY** [COMPLETED] alteplase (ACTIVASE) injection 80.8 mg, 0.81 mg/kg, Intravenous, Once **FOLLOWED BY** 0.9 % sodium chloride bolus, 50 mL, Intravenous, Once  0.9 % sodium chloride infusion, , Intravenous, Continuous    REVIEW OF SYSTEMS     Unable to perform patient aphasia and dysarthric                                                  PHYSICAL EXAM:     /70   Pulse 65   Temp 97.4 °F (36.3 °C) (Axillary)   Resp 20   Wt 220 lb (99.8 kg)   SpO2 99%     PHYSICAL EXAM:  CONSTITUTIONAL:   No acute distress, aphasia, dysarthria. Vital signs stable   HEAD:  normocephalic, atraumatic    EYES:  PERRLA, EOMI.   ENT:  moist mucous membranes   LUNGS:  Equal air entry bilaterally   CARDIOVASCULAR:  normal s1 / s2   ABDOMEN:  Soft, no rigidity   NECK supple, symmetric, no midline tenderness to palpation    BACK without midline tenderness, step-offs or deformities    EXTREMITIES Normal ROM with no deformities   NEUROLOGIC:  Mental Status: Awake.   Speech RBC 3.90 05/13/2021    HGB 13.8 05/13/2021    HCT 42.1 05/13/2021     05/13/2021    .9 05/13/2021    MCH 35.4 05/13/2021    MCHC 32.8 05/13/2021    RDW 12.6 05/13/2021    LYMPHOPCT 41 05/13/2021    MONOPCT 8 05/13/2021    BASOPCT 1 05/13/2021    MONOSABS 0.22 05/13/2021    LYMPHSABS 1.15 05/13/2021    EOSABS 0.15 05/13/2021    BASOSABS <0.03 05/13/2021    DIFFTYPE NOT REPORTED 05/13/2021     BMP:    Lab Results   Component Value Date     05/13/2021    K 4.1 05/13/2021     05/13/2021    CO2 21 05/13/2021    BUN 21 05/13/2021    CREATININE 0.93 05/13/2021    CALCIUM 8.8 05/13/2021    GFRAA >60 05/13/2021    LABGLOM >60 05/13/2021    GLUCOSE 92 05/13/2021       RADIOLOGY:     Ct Head Wo Contrast    Result Date: 5/13/2021  EXAMINATION: CT OF THE HEAD WITHOUT CONTRAST  5/13/2021 11:03 pm TECHNIQUE: CT of the head was performed without the administration of intravenous contrast. Dose modulation, iterative reconstruction, and/or weight based adjustment of the mA/kV was utilized to reduce the radiation dose to as low as reasonably achievable. COMPARISON: Head CT 05/13/2021 HISTORY: ORDERING SYSTEM PROVIDED HISTORY: r/o stroke, s/p TPA TECHNOLOGIST PROVIDED HISTORY: r/o stroke, s/p TPA Decision Support Exception - unselect if not a suspected or confirmed emergency medical condition->Emergency Medical Condition (MA) Reason for Exam: stroke alert, post tpa Acuity: Acute Type of Exam: Initial FINDINGS: BRAIN/VENTRICLES: There is no acute intracranial hemorrhage, mass effect or midline shift. No abnormal extra-axial fluid collection. The gray-white differentiation is maintained without evidence of an acute infarct. There is no evidence of hydrocephalus. Intracranial vascular calcifications. ORBITS: The visualized portion of the orbits demonstrate no acute abnormality. SINUSES: The visualized paranasal sinuses and mastoid air cells demonstrate no acute abnormality.  SOFT TISSUES/SKULL:  No acute abnormality of the visualized skull or soft tissues. No acute intracranial abnormality. No hemorrhagic conversion status post tPA. Ct Head Wo Contrast    Result Date: 5/13/2021  EXAMINATION: CT OF THE HEAD WITHOUT CONTRAST  5/13/2021 9:17 pm TECHNIQUE: CT of the head was performed without the administration of intravenous contrast. Dose modulation, iterative reconstruction, and/or weight based adjustment of the mA/kV was utilized to reduce the radiation dose to as low as reasonably achievable. COMPARISON: None. HISTORY: ORDERING SYSTEM PROVIDED HISTORY: Stroke TECHNOLOGIST PROVIDED HISTORY: Stroke Decision Support Exception - unselect if not a suspected or confirmed emergency medical condition->Emergency Medical Condition (MA) FINDINGS: Exam performed on a mobile/portable CT scan. Only axial images are available for review. BRAIN/VENTRICLES: Motion and attenuation artifact challenge evaluation. There is no acute intracranial hemorrhage, mass effect or midline shift. No abnormal extra-axial fluid collection. The gray-white differentiation is grossly maintained without evidence of an acute infarct. There is no evidence of hydrocephalus. ORBITS: The visualized portion of the orbits demonstrate no acute abnormality. SINUSES: The visualized paranasal sinuses and mastoid air cells demonstrate no acute abnormality. SOFT TISSUES/SKULL:  No acute abnormality of the visualized skull or soft tissues. No acute bleed or midline shift. The findings were sent to [facility] electronically at 9:47 pm on 5/13/2021            Labs and Images reviewed with:    [] Sebastian Deleon MD    [] Dagoberto Corral MD  [x] Anastasiia Zendejas MD  --[] there are no new interval images to review. ASSESSMENT AND PLAN:         ASSESSMENT:     This is a 77 y.o. male with difficulty hearing severe expressive aphasia and dysarthria. NIH 4.  Mobile stroke, received TPA.   Last known well 7:15 PM TPA delivered at 10:45 PM.  Vital signs and initial laboratory work unremarkable. Will need to speak with family to get better history and physical past medical history    Patient care will be discussed with attending, will reevaluate patient along with attending.      PLAN/MEDICAL DECISION MAKING:      NEUROLOGIC:  - Imaging follow-up CTA, stability head CT 24 hours  - Goal SBP less than 185  - Neuro checks per protocol  -Lipitor 80 mg daily  -Pending lipid profile  -Status post TPA 5/13 at 10:45 PM, last known well 7:15 PM    CARDIOVASCULAR:  - Goal SBP less than 185  - Continue telemetry  -Follow-up echo bubble study    PULMONARY:  -Incentive spirometry    RENAL/FLUID/ELECTROLYTE:  - IVF: NS at 125 mL an hour  - Replace electrolytes PRN  - Daily BMP    GI/NUTRITION:  NUTRITION:  -N.p.o.    ID:  - Continue to monitor for fevers  - Daily CBC    HEME:   - H&H 13.8/42.1  - Platelets 853  - Daily CBC    ENDOCRINE:  - Continue to monitor blood glucose, goal <180  -Pending hemoglobin A1c    OTHER:  - PT/OT/ST     PROPHYLAXIS:  Stress ulcer: None    DVT PROPHYLAXIS:  - SCD sleeves - Thigh High   -Status post TPA    DISPOSITION: ICU      Elen Ahumada MD  Neuro Critical Care Service   Pager [phone]  5/13/2021     11:44 PM

## 2021-05-14 NOTE — ED NOTES
Dr Jamie Lerner notified of possible allergic reaction to TPA, pt has horse voice and hives on face.  Pt given meds to counter act reaction      Jimmy Deleon RN  05/13/21 0739

## 2021-05-14 NOTE — PROGRESS NOTES
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  General  Chart Reviewed: Yes     Vision  Vision: Within Functional Limits  Hearing  Hearing: Exceptions to [facility]           Objective:     Oral/Motor  Oral Motor: Within functional limits      Motor Speech  Motor Speech:  Within Functional Limits         Cognition:      Orientation  Overall Orientation Status: Within Normal Limits  Attention  Attention: Within Functional Limits  Memory  Memory: Exceptions to W[employer](3/3, 3/5)  Problem Solving  Problem Solving: Exceptions to [facility]  Verbal Reasoning Skills: Mild(word associations 4/5)  Abstract Reasoning  Abstract Reasoning: Exceptions to [facility]  Divergent Thinking: Mild(t words 6, d words 9)  Safety/Judgement  Safety/Judgement: Exceptions to [facility]  Flexibility of Thought: Mild(5/6)    Prognosis:  Speech Therapy Prognosis  Prognosis: Good    Education:  Patient Education: yes  Patient Education Response: Verbalizes understanding          Therapy Time:   Individual Concurrent Group Co-treatment   Time In 1045         Time Out 1055         Minutes [id number] Sang Murphy, SLP  5/14/2021 11:13 AM

## 2021-05-14 NOTE — PROGRESS NOTES
AUTHORIZATION FOR RELEASE OF   CONFIDENTIAL INFORMATION    Dr Blas    We are seeing Naomi Toledo, date of birth 1952, in the clinic at [facility]. Tammy Benedict MD is the patient's PCP. Naomi Toledo has an outstanding lab/procedure at the time we reviewed her chart. In order to help keep her health information updated, she has authorized us to request the following medical record(s):        (  )  MAMMOGRAM                                      (  )  COLONOSCOPY      (  )  PAP SMEAR                                          (  )  OUTSIDE LAB RESULTS     (  )  DEXA SCAN                                          ( X )  EYE EXAM            (  )  FOOT EXAM                                          (  )  ENTIRE RECORD     (  )  OUTSIDE IMMUNIZATIONS                 (  )  _______________         Please fax records to [facility], Amy L Hammons, MD, [phone]    Thank you in advance,      Kalpana GRAY  Panel Care Coordinator  [facility] [address] [place] [address]  Phone [phone]  Fax [phone]          Patient Name: Naomi Toledo  : 1952  Patient Phone #: [phone]      Occupational Therapy   Occupational Therapy Initial Assessment  Date: 2021   Patient Name: Jing Mendez  MRN: [id number]     : 1955    Date of Service: 2021     Chief Complaint   Patient presents with    Cerebrovascular Accident     Robbin Kang is a 77 y.o. male who presents with leg symptoms by mobile stroke. Patient received TPA after consultation with the stroke team by mobile stroke. Per low stroke, patient was aphasic, and had right-sided facial droop. Patient is speaking upon arrival, but has a raspy voice. Patient states this is his baseline, however no previous records to compare to and no family available at initial presentation. No limb weakness, sensory changes. Discharge Recommendations:  Home with assist PRN  OT Equipment Recommendations  Equipment Needed: No    Assessment   Assessment: Pt independently ambulated around hospital floor (x2) demonstrating household distances. Pt completed LB dressing, toileting and grooming tasks independently. Pt stood statically waiting in line for patient bathroom for +10 minutes while answering eval questions with no concern. Pt with no concerns and reports feeling back at his baseline. Pt does not currently require skilled OT services during his acute hospitilization stay or post discharge. Pt educated to report to RN/MD if functional changes occur during stay with good return. Prognosis: Good  Decision Making: Low Complexity  OT Education: OT Role;Plan of Care;Transfer Training  Patient Education: purpose of gait belt-good return  REQUIRES OT FOLLOW UP: No  Activity Tolerance  Activity Tolerance: Patient Tolerated treatment well  Safety Devices  Safety Devices in place: Yes  Type of devices: Nurse notified;Call light within reach; Left in bed  Restraints  Initially in place: No           Patient Diagnosis(es): The primary encounter diagnosis was Received tissue plasminogen activator less than 24 hours prior to arrival. A diagnosis of WFL  Left Hand AROM (degrees)  Left Hand AROM: WFL  RUE AROM (degrees)  RUE AROM : WFL  Right Hand AROM (degrees)  Right Hand AROM: WFL  LUE Strength  Gross LUE Strength: WFL  L Hand General: 5/5  LUE Strength Comment: Grossly 5/5  RUE Strength  Gross RUE Strength: WFL  R Hand General: 5/5  RUE Strength Comment: Grossly 5/5        Plan   Plan  Plan Comment: Pt independent and will be discharged from OT services.       AM-PAC Score  AM-[facility] Inpatient Daily Activity Raw Score: 24  AM-PAC Inpatient Daily T-Scale Score 57.54  AM-PAC Inpatient Daily CMS 0-100% 0  ADL Inpatient CMS G-Code Modifier [facility]         Therapy Time   Individual Concurrent Group Co-treatment   Time In 1138         Time Out 1220         Minutes Maurilio Lockhart OTR/L

## 2021-05-14 NOTE — PLAN OF CARE
Problem: HEMODYNAMIC STATUS  Goal: Patient has stable vital signs and fluid balance  5/14/2021 1834 by Joyce Kidd RN  Outcome: Ongoing  5/14/2021 0651 by Lissette Aceves RN  Outcome: Ongoing     Problem: Falls - Risk of:  Goal: Will remain free from falls  Description: Will remain free from falls  Outcome: Ongoing  Goal: Absence of physical injury  Description: Absence of physical injury  Outcome: Ongoing

## 2021-05-14 NOTE — CONSULTS
phone: Not on file     Gets together: Not on file     Attends Gnosticist service: Not on file     Active member of club or organization: Not on file     Attends meetings of clubs or organizations: Not on file     Relationship status: Not on file    Intimate partner violence     Fear of current or ex partner: Not on file     Emotionally abused: Not on file     Physically abused: Not on file     Forced sexual activity: Not on file   Other Topics Concern    Not on file   Social History Narrative    Not on file       Family   History: No family history on file. REVIEW   OF   SYSTEMS:      As   above   and:   CONSTITUTIONAL:  negative for fevers, chills, sweats, fatigue, weight loss  HEENT:  negative for vision, hearing changes, runny nose, throat pain  RESPIRATORY:  negative for shortness of breath, cough, congestion, wheezing  CARDIOVASCULAR:  negative for chest pain, palpitations  GASTROINTESTINAL:  negative for nausea, vomiting, diarrhea, constipation, change in bowel habits, abdominal pain   GENITOURINARY:  negative for difficulty of urination, burning with urination, frequency   INTEGUMENT:  negative for rash, skin lesions, easy bruising   HEMATOLOGIC/LYMPHATIC:  negative for swelling/edema   ALLERGIC/IMMUNOLOGIC:  negative for urticaria , itching  ENDOCRINE:  negative increase in drinking, increase in urination, hot or cold intolerance  MUSCULOSKELETAL:  negative joint pains, muscle aches, swelling of joints  NEUROLOGICAL:  Left facial droop. Expressive aphasia.   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 It is also projecting into  the airway resulting in significant airway narrowing, recommend airway  precautions. Recommend ENT consult.                 Impression   No evidence of large vessel occlusion or hemodynamically significant stenosis   involving the head and neck arteries.                    EXAM:   VITALS:      BP (!) 146/97   Pulse 74   Temp 97.5 °F (36.4 °C) (Oral)   Resp 11   Ht 6' (1.829 m)   Wt 219 lb 12.8 oz (99.7 kg)   SpO2 95%   BMI 29.81 kg/m²     CONSTITUTIONAL:      awake,   alert,   cooperative,   no   apparent   distress,   and   appears   stated   age   No labored breathing, no stridor, normal voice  EYES:      Lids   and   lashes   normal,   pupils   equal,   round   and   reactive   to   light,   extra   ocular   muscles intact,   sclera   clear,   conjunctiva   Normal  HEAD:      Normocephalic,   without   obvious   abnormality,   atraumatic,   sinuses   nontender   on   palpation,   EARS:external   ears   without   lesions,   Minimal wax noted  NOSE: patent nasal airway, no bleeding noted, no lesions, +DNS  ORAL: oral cavity &  pharynx   with   moist   mucus   membranes,   tonsils   without erythema   or   exudates,   no fluctuance, uvula midline, gums   normal   and   good   Dentition. The floor of mouth is soft without induration, the tongue base is soft as well. NECK:      Supple,   symmetrical,   trachea   midline,   no   adenopathy,   thyroid   symmetric,   not   enlarged and   no   tenderness,   skin   Normal  CHEST: equal expansion and symmetric, lungs CTA  CV: Heart RRR  MUSCULOSKELETAL:      There   is   no   redness,   warmth,   or   swelling   of   the   joints. Full   range   of motion   noted. Motor   strength   is   5   out   of   5   all   extremities   bilaterally. Tone   is   normal.   NEUROLOGIC:      Awake,   alert,   oriented   to   name,   place   and   time.       Cranial   nerves II-XII   are grossly   intact. Motor   is   5   out   of   5   bilaterally. Sensory   is   Intact. PSYCH: Normal Mood and affect  SKIN: Normal turgor, no rash    PROCEDURE NOTE:    Pre-Op Diagnosis: Hoarseness    Post-op Diagnosis: same    Procedure : Flexible fiberoptic Laryngoscopy    Indications: based on the diagnoses above, it was recommended that this patient undergo a flexible laryngoscopy. Risks, benefits and alternatives discussed, and verbal consent obtained. Procedure: A flexible laryngoscope was inserted into the more patent nostril and passed through the nasal passage and nasopharynx to visualize the oropharynx, hypopharynx, and larynx in detail. The structures examined included the vocal cords, epiglottis, tongue base, hypopharynx and larynx. The airway appeared to be patent and the vocal cords appeared to be mobile. No large exophytic lesions were seen as noted in the CTA neck. Mild mucosal fullness noted anteriorly in the larynx but no leukoplakia or neoplasms seen. Patient tolerated procedure well without any complications.     Electronically   signed   by   Aby Stevens MD   on   5/14/2021   at   9:16 AM

## 2021-05-14 NOTE — ED PROVIDER NOTES
Faculty Sign-Out Attestation  Handoff taken on the following patient from prior Attending Physician: Amanda Hauser    I was available and discussed any additional care issues that arose and coordinated the management plans with the resident(s) caring for the patient during my duty period. Any areas of disagreement with residents documentation of care or procedures are noted on the chart. I was personally present for the key portions of any/all procedures during my duty period. I have documented in the chart those procedures where I was not present during the key portions.     Facial droop, aphasia, ct no bleed, cta pending, given TPA, now A/O, neuro following, needing admit    Juan Hanna,   Attending Physician     Juan Hanna,   05/13/21 8018  -neuro team noted pt to have hoarse voice, posterior pharynx clear, pt has hoarse \ dry voice, floor of mouth soft, no airway involvement,   Erythematous lesion L cheek,   TPA stopped, pepcid benadryl solumedrol     Juan Hanna,   05/13/21 2622  - ECG normal sinus, hr 66, no st elevation, normal axis, qtc [facility],   05/14/21 0004

## 2021-05-14 NOTE — ED PROVIDER NOTES
[facility] ED  Emergency Department Encounter  EmergencyMedicine Resident     Pt Name:Saeed Prakash  MRN: [id number]  [place] 1955  Date of evaluation: 5/13/21  PCP:  No primary care provider on file. CHIEF COMPLAINT       Chief Complaint   Patient presents with    Cerebrovascular Accident       HISTORY OF PRESENT ILLNESS  (Location/Symptom, Timing/Onset, Context/Setting, Quality, Duration, Modifying Factors, Severity.)      Stacie Andrade is a 77 y.o. male who presents with leg symptoms by mobile stroke. Patient received TPA after consultation with the stroke team by mobile stroke. Per low stroke, patient was aphasic, and had right-sided facial droop. Patient is speaking upon arrival, but has a raspy voice. Patient states this is his baseline, however no previous records to compare to and no family available at initial presentation. No limb weakness, sensory changes. PAST MEDICAL / SURGICAL / SOCIAL / FAMILY HISTORY      has no past medical history on file. has no past surgical history on file.       Social History     Socioeconomic History    Marital status: Not on file     Spouse name: Not on file    Number of children: Not on file    Years of education: Not on file    Highest education level: Not on file   Occupational History    Not on file   Social Needs    Financial resource strain: Not on file    Food insecurity     Worry: Not on file     Inability: Not on file    Transportation needs     Medical: Not on file     Non-medical: Not on file   Tobacco Use    Smoking status: Not on file   Substance and Sexual Activity    Alcohol use: Not on file    Drug use: Not on file    Sexual activity: Not on file   Lifestyle    Physical activity     Days per week: Not on file     Minutes per session: Not on file    Stress: Not on file   Relationships    Social connections     Talks on phone: Not on file     Gets together: Not on file     Attends Gnosticist service: Not on file     Active member of club or organization: Not on file     Attends meetings of clubs or organizations: Not on file     Relationship status: Not on file    Intimate partner violence     Fear of current or ex partner: Not on file     Emotionally abused: Not on file     Physically abused: Not on file     Forced sexual activity: Not on file   Other Topics Concern    Not on file   Social History Narrative    Not on file       No family history on file. Allergies:  Patient has no allergy information on record. Home Medications:  Prior to Admission medications    Not on File       REVIEW OF SYSTEMS    (2-9 systems for level 4, 10 or more for level 5)      Review of Systems   Constitutional: Negative for chills, diaphoresis, fatigue and fever. HENT: Positive for voice change. Negative for congestion, rhinorrhea and trouble swallowing. Eyes: Negative for photophobia and visual disturbance. Respiratory: Negative for cough, chest tightness, shortness of breath and wheezing. Cardiovascular: Negative for chest pain, palpitations and leg swelling. Gastrointestinal: Negative for abdominal pain, constipation, diarrhea, nausea and vomiting. Endocrine: Negative for polydipsia, polyphagia and polyuria. Genitourinary: Negative for difficulty urinating, dysuria, flank pain and hematuria. Musculoskeletal: Negative for arthralgias, back pain, neck pain and neck stiffness. Neurological: Positive for light-headedness. Negative for dizziness, weakness, numbness and headaches. Psychiatric/Behavioral: Negative for agitation and behavioral problems. PHYSICAL EXAM   (up to 7 for level 4, 8 or more for level 5)      INITIAL VITALS:   /70   Pulse 65   Temp 97.4 °F (36.3 °C) (Axillary)   Resp 20   Wt 220 lb (99.8 kg)   SpO2 99%     Physical Exam  Constitutional:       General: He is not in acute distress. Appearance: Normal appearance. He is well-developed. He is not diaphoretic.    HENT: BY Linked Order Group     alteplase (ACTIVASE) injection 9 mg     alteplase (ACTIVASE) injection 80.8 mg     0.9 % sodium chloride bolus    iopamidol (ISOVUE-370) 76 % injection 90 mL    methylPREDNISolone sodium (SOLU-MEDROL) injection 125 mg    diphenhydrAMINE (BENADRYL) injection 25 mg    famotidine (PEPCID) injection 20 mg    0.9 % sodium chloride infusion    midazolam PF (VERSED) injection 2 mg    midazolam PF (VERSED) injection 3 mg    0.9 % sodium chloride bolus       DDX: Stroke, TIA, seizure, metabolic encephalopathy    MDM/IMPRESSION: This is a 14-year-old male presenting with strokelike symptoms. Last known well 7:15 PM.  Received TPA by mobile stroke after consultation with stroke team.  tPA nearly finished prior to arrival.  Patient was initially aphasic, had some right-sided facial droop, but otherwise no neuro symptoms. Patient has a raspy voice here, unclear if this is baseline. Stroke alert called, EKG ordered, stroke panel ordered, anticipate ordering CTAs in conjunction with stroke team.  Admission to neuro critical care given post TPA    DIAGNOSTIC RESULTS / [address] [place] [address] / [facility]   LAB RESULTS:  Results for orders placed or performed during the hospital encounter of 05/13/21   COVID-19, Rapid    Specimen: Nasopharyngeal Swab   Result Value Ref Range    Specimen Description . NASOPHARYNGEAL SWAB     SARS-CoV-2, Rapid Not Detected Not Detected   STROKE PANEL   Result Value Ref Range    Glucose 92 70 - 99 mg/dL    BUN 21 8 - 23 mg/dL    CREATININE 0.93 0.70 - 1.20 mg/dL    Bun/Cre Ratio NOT REPORTED 9 - 20    Calcium 8.8 8.6 - 10.4 mg/dL    Sodium 138 135 - 144 mmol/L    Potassium 4.1 3.7 - 5.3 mmol/L    Chloride 104 98 - 107 mmol/L    CO2 21 20 - 31 mmol/L    Anion Gap 13 9 - 17 mmol/L    GFR Non-African American >60 >60 mL/min    GFR African American >60 >60 mL/min    GFR Comment          GFR Staging NOT REPORTED     WBC 2.8 (L) 3.5 - 11.3 k/uL    RBC 3.90 (L) 4.21 - 5.77 m/uL    Hemoglobin 13.8 13.0 - 17.0 g/dL    Hematocrit 42.1 40.7 - 50.3 %    .9 (H) 82.6 - 102.9 fL    MCH 35.4 (H) 25.2 - 33.5 pg    MCHC 32.8 28.4 - 34.8 g/dL    RDW 12.6 11.8 - 14.4 %    Platelets 268 854 - 205 k/uL    MPV 9.6 8.1 - 13.5 fL    NRBC Automated 0.0 0.0 per 100 WBC    Total  39 - 308 U/L    CK-MB 6.2 <10.5 ng/mL    % CKMB 5.3 (H) 0.0 - 3.5 %    CKMB Interpretation NORMAL ISOENZYME PATTERN     Differential Type NOT REPORTED     Seg Neutrophils 44 36 - 65 %    Lymphocytes 41 24 - 43 %    Monocytes 8 3 - 12 %    Eosinophils % 5 (H) 1 - 4 %    Basophils 1 0 - 2 %    Immature Granulocytes 1 (H) 0 %    Segs Absolute 1.23 (L) 1.50 - 8.10 k/uL    Absolute Lymph # 1.15 1.10 - 3.70 k/uL    Absolute Mono # 0.22 0.10 - 1.20 k/uL    Absolute Eos # 0.15 0.00 - 0.44 k/uL    Basophils Absolute <0.03 0.00 - 0.20 k/uL    Absolute Immature Granulocyte <0.03 0.00 - 0.30 k/uL    WBC Morphology NOT REPORTED     RBC Morphology MACROCYTOSIS PRESENT     Platelet Estimate NOT REPORTED     Myoglobin 50 28 - 72 ng/mL    Protime 9.7 9.1 - 12.3 sec    INR 0.9     PTT 24.1 20.5 - 30.5 sec    Troponin, High Sensitivity 22 0 - 22 ng/L    Troponin T NOT REPORTED <0.03 ng/mL    Troponin Interp NOT REPORTED          RADIOLOGY:  CTA HEAD NECK W CONTRAST   Final Result   No evidence of large vessel occlusion or hemodynamically significant stenosis   involving the head and neck arteries. CT HEAD WO CONTRAST   Final Result   No acute intracranial abnormality. No hemorrhagic conversion status post tPA. CT HEAD WO CONTRAST   Final Result   No acute bleed or midline shift.       The findings were sent to Michael Jaimes electronically at 9:47 pm on 5/13/2021              EKG  EKG Interpretation    Interpreted by emergency department physician    Rhythm: normal sinus   Rate: normal  Axis: normal  Ectopy: none  Conduction: normal  ST Segments: no acute change  T Waves: no acute change  Q Waves: none    Clinical Impression: no acute changes, limited by artifact    Margaret Zendejas    All EKG's are interpreted by the Emergency Department Physician who either signs or Co-signs this chart in the absence of a cardiologist.    EMERGENCY DEPARTMENT COURSE:  ED Course as of May 14 0100   Thu May 13, 2021   2242 Last known well 7:15 PM, given TPA by mobile stroke. Patient presented with aphasia and slight right-sided facial droop which has improved. Patient states this raspy voice this is baseline. [JG]   2342 Called to bedside over concern for allergic reaction. Patient has small erythematous and raised edematous region on his left cheek. No airway involvement, tolerating his own secretions without difficulty. Oropharynx clear, no edema, no sublingual edema. Will give Solu-Medrol, Benadryl, Pepcid. [JG]      ED Course User Index  [JG] Margaret Zendejas DO        PROCEDURES:      CONSULTS:  IP CONSULT TO NEUROCRITICAL CARE    CRITICAL CARE:      FINAL IMPRESSION      1. Received tissue plasminogen activator less than 24 hours prior to arrival    2. Cerebrovascular accident (CVA), unspecified mechanism ([facility] [address]          DISPOSITION / [address] Admitted 05/13/2021 11:41:31 PM      PATIENT REFERRED TO:  No follow-up provider specified.     DISCHARGE MEDICATIONS:  New Prescriptions    No medications on file       Margaret Zendejas DO  Emergency Medicine Resident    (Please note that portions of thisnote were completed with a voice recognition program.  Efforts were made to edit the dictations but occasionally words are mis-transcribed.)     Margaret Zendejas DO  Resident  05/14/21 0100

## 2021-05-14 NOTE — ED PROVIDER NOTES
[facility] [address] ED     Emergency Department     Faculty Attestation    I performed a history and physical examination of the patient and discussed management with the resident. I reviewed the residents note and agree with the documented findings and plan of care. Any areas of disagreement are noted on the chart. I was personally present for the key portions of any procedures. I have documented in the chart those procedures where I was not present during the key portions. I have reviewed the emergency nurses triage note. I agree with the chief complaint, past medical history, past surgical history, allergies, medications, social and family history as documented unless otherwise noted below. For Physician Assistant/ Nurse Practitioner cases/documentation I have personally evaluated this patient and have completed at least one if not all key elements of the E/M (history, physical exam, and MDM). Additional findings are as noted. Patient brought in by mobile stroke unit as a stroke alert. Patient was found slumped over in his chair at around 715 this evening. On mobile stroke arrival, patient was combative and patient was given Versed. CT scan of the head was done did not show a bleed. Patient was given TPA. Patient is now alert and oriented and answering questions appropriately. He is following all commands and has good strength to the upper and lower extremities. Mobile stroke unit did find a mild left-sided facial droop and seemed to have expressive aphasia when they first arrived but now that seems to have resolved. Patient does have a raspy voice but patient states that this is normal for him. He is hard of hearing. Neurology is in the room and will order CTA of the head and neck. Will obtain labs and EKG and plan to admit patient.       Coby Valencia MD  Attending Emergency  Physician              Isabelle Jimenez MD  05/13/21 8574

## 2021-05-15 ENCOUNTER — APPOINTMENT (OUTPATIENT)
Dept: MRI IMAGING | Age: 66
DRG: 063 | End: 2021-05-15
Payer: MEDICARE

## 2021-05-15 ENCOUNTER — APPOINTMENT (OUTPATIENT)
Dept: CT IMAGING | Age: 66
DRG: 063 | End: 2021-05-15
Payer: MEDICARE

## 2021-05-15 VITALS
TEMPERATURE: 98.4 F | WEIGHT: 219.8 LBS | DIASTOLIC BLOOD PRESSURE: 75 MMHG | RESPIRATION RATE: 16 BRPM | HEART RATE: 66 BPM | BODY MASS INDEX: 29.77 KG/M2 | HEIGHT: 72 IN | SYSTOLIC BLOOD PRESSURE: 100 MMHG | OXYGEN SATURATION: 97 %

## 2021-05-15 LAB
ABSOLUTE EOS #: 0.18 K/UL (ref 0–0.4)
ABSOLUTE IMMATURE GRANULOCYTE: 0 K/UL (ref 0–0.3)
ABSOLUTE LYMPH #: 1.98 K/UL (ref 1–4.8)
ABSOLUTE MONO #: 0.72 K/UL (ref 0.1–0.8)
ANION GAP SERPL CALCULATED.3IONS-SCNC: 9 MMOL/L (ref 9–17)
BASOPHILS # BLD: 1 % (ref 0–2)
BASOPHILS ABSOLUTE: 0.06 K/UL (ref 0–0.2)
BUN BLDV-MCNC: 15 MG/DL (ref 8–23)
BUN/CREAT BLD: ABNORMAL (ref 9–20)
CALCIUM SERPL-MCNC: 8.4 MG/DL (ref 8.6–10.4)
CHLORIDE BLD-SCNC: 106 MMOL/L (ref 98–107)
CO2: 20 MMOL/L (ref 20–31)
CREAT SERPL-MCNC: 0.83 MG/DL (ref 0.7–1.2)
DIFFERENTIAL TYPE: ABNORMAL
EOSINOPHILS RELATIVE PERCENT: 3 % (ref 1–4)
GFR AFRICAN AMERICAN: >60 ML/MIN
GFR NON-AFRICAN AMERICAN: >60 ML/MIN
GFR SERPL CREATININE-BSD FRML MDRD: ABNORMAL ML/MIN/{1.73_M2}
GFR SERPL CREATININE-BSD FRML MDRD: ABNORMAL ML/MIN/{1.73_M2}
GLUCOSE BLD-MCNC: 97 MG/DL (ref 70–99)
HCT VFR BLD CALC: 41.1 % (ref 40.7–50.3)
HEMOGLOBIN: 13 G/DL (ref 13–17)
IMMATURE GRANULOCYTES: 0 %
LYMPHOCYTES # BLD: 33 % (ref 24–44)
MCH RBC QN AUTO: 35.8 PG (ref 25.2–33.5)
MCHC RBC AUTO-ENTMCNC: 31.6 G/DL (ref 28.4–34.8)
MCV RBC AUTO: 113.2 FL (ref 82.6–102.9)
MONOCYTES # BLD: 12 % (ref 1–7)
MORPHOLOGY: ABNORMAL
NRBC AUTOMATED: 0 PER 100 WBC
PDW BLD-RTO: 12.7 % (ref 11.8–14.4)
PLATELET # BLD: 159 K/UL (ref 138–453)
PLATELET ESTIMATE: ABNORMAL
PMV BLD AUTO: 9.4 FL (ref 8.1–13.5)
POTASSIUM SERPL-SCNC: 4.6 MMOL/L (ref 3.7–5.3)
RBC # BLD: 3.63 M/UL (ref 4.21–5.77)
RBC # BLD: ABNORMAL 10*6/UL
SEG NEUTROPHILS: 51 % (ref 36–66)
SEGMENTED NEUTROPHILS ABSOLUTE COUNT: 3.06 K/UL (ref 1.8–7.7)
SODIUM BLD-SCNC: 135 MMOL/L (ref 135–144)
WBC # BLD: 6 K/UL (ref 3.5–11.3)
WBC # BLD: ABNORMAL 10*3/UL

## 2021-05-15 PROCEDURE — 6370000000 HC RX 637 (ALT 250 FOR IP): Performed by: STUDENT IN AN ORGANIZED HEALTH CARE EDUCATION/TRAINING PROGRAM

## 2021-05-15 PROCEDURE — 85025 COMPLETE CBC W/AUTO DIFF WBC: CPT

## 2021-05-15 PROCEDURE — 99239 HOSP IP/OBS DSCHRG MGMT >30: CPT | Performed by: PSYCHIATRY & NEUROLOGY

## 2021-05-15 PROCEDURE — 6360000002 HC RX W HCPCS: Performed by: PSYCHIATRY & NEUROLOGY

## 2021-05-15 PROCEDURE — 70551 MRI BRAIN STEM W/O DYE: CPT

## 2021-05-15 PROCEDURE — 36415 COLL VENOUS BLD VENIPUNCTURE: CPT

## 2021-05-15 PROCEDURE — 2580000003 HC RX 258: Performed by: PSYCHIATRY & NEUROLOGY

## 2021-05-15 PROCEDURE — 80048 BASIC METABOLIC PNL TOTAL CA: CPT

## 2021-05-15 PROCEDURE — 2580000003 HC RX 258: Performed by: STUDENT IN AN ORGANIZED HEALTH CARE EDUCATION/TRAINING PROGRAM

## 2021-05-15 PROCEDURE — 6370000000 HC RX 637 (ALT 250 FOR IP): Performed by: PSYCHIATRY & NEUROLOGY

## 2021-05-15 RX ORDER — ASPIRIN 81 MG/1
81 TABLET, CHEWABLE ORAL DAILY
Qty: 30 TABLET | Refills: 3 | Status: SHIPPED | OUTPATIENT
Start: 2021-05-15

## 2021-05-15 RX ORDER — FOLIC ACID 1 MG/1
1 TABLET ORAL DAILY
Qty: 30 TABLET | Refills: 3 | Status: SHIPPED | OUTPATIENT
Start: 2021-05-16

## 2021-05-15 RX ORDER — ASPIRIN 81 MG/1
81 TABLET, CHEWABLE ORAL DAILY
Status: DISCONTINUED | OUTPATIENT
Start: 2021-05-15 | End: 2021-05-15 | Stop reason: HOSPADM

## 2021-05-15 RX ORDER — ROSUVASTATIN CALCIUM 20 MG/1
40 TABLET, COATED ORAL NIGHTLY
Status: DISCONTINUED | OUTPATIENT
Start: 2021-05-15 | End: 2021-05-15 | Stop reason: HOSPADM

## 2021-05-15 RX ORDER — ROSUVASTATIN CALCIUM 40 MG/1
40 TABLET, COATED ORAL NIGHTLY
Qty: 30 TABLET | Refills: 3 | Status: SHIPPED | OUTPATIENT
Start: 2021-05-15 | Stop reason: SDUPTHER

## 2021-05-15 RX ADMIN — THIAMINE HYDROCHLORIDE 500 MG: 100 INJECTION, SOLUTION INTRAMUSCULAR; INTRAVENOUS at 08:47

## 2021-05-15 RX ADMIN — ASPIRIN 81 MG: 81 TABLET ORAL at 11:50

## 2021-05-15 RX ADMIN — PRIMIDONE 250 MG: 250 TABLET ORAL at 08:42

## 2021-05-15 RX ADMIN — TOPIRAMATE 50 MG: 25 TABLET, FILM COATED ORAL at 08:42

## 2021-05-15 RX ADMIN — FOLIC ACID 1 MG: 1 TABLET ORAL at 08:42

## 2021-05-15 RX ADMIN — LISINOPRIL 10 MG: 10 TABLET ORAL at 08:42

## 2021-05-15 RX ADMIN — PROPRANOLOL HYDROCHLORIDE 40 MG: 40 TABLET ORAL at 08:42

## 2021-05-15 RX ADMIN — SODIUM CHLORIDE, PRESERVATIVE FREE 10 ML: 5 INJECTION INTRAVENOUS at 09:57

## 2021-05-15 NOTE — PLAN OF CARE
Problem: HEMODYNAMIC STATUS  Goal: Patient has stable vital signs and fluid balance  5/15/2021 0657 by Denice Pierce RN  Outcome: Met This Shift     Problem: Falls - Risk of:  Goal: Will remain free from falls  Description: Will remain free from falls  5/15/2021 0657 by Denice Pierce RN  Outcome: Met This Shift     Problem: Falls - Risk of:  Goal: Absence of physical injury  Description: Absence of physical injury  5/15/2021 0657 by Denice Pierce RN  Outcome: Met This Shift

## 2021-05-15 NOTE — DISCHARGE SUMMARY
Neuro critical care Discharge Summary     Patient Identification:  Hannah Bermudez is a 77 y.o. male. :  1955  Admit Date:  2021  Discharge date and time: 5/15/2021 12:00 PM   Attending Provider: Gavin Mittal MD    Account Number: [id number]                                   Admission Diagnoses:   Stroke aborted by administration of thrombolytic agent [facility]) [I63.9]  Stroke aborted by administration of thrombolytic agent [facility]) [I63.9]    Discharge Diagnoses: Active Problems:    Stroke aborted by administration of thrombolytic agent ([facility] [address]    Received tissue plasminogen activator less than 24 hours prior to arrival  Resolved Problems:    * No resolved hospital problems. *    Discharge condition: good    Discharge Medications:    Discharge Medication List as of 5/15/2021 11:48 AM           Details   aspirin 81 MG chewable tablet Take 1 tablet by mouth daily, Disp-30 tablet, R-3Normal      rosuvastatin (CRESTOR) 40 MG tablet Take 1 tablet by mouth nightly, Disp-30 tablet, [id number]      folic acid (FOLVITE) 1 MG tablet Take 1 tablet by mouth daily, Disp-30 tablet, R-3Normal              Details   propranolol (INDERAL) 40 MG tablet Take 40 mg by mouth 3 times dailyHistorical Med      topiramate (TOPAMAX) 25 MG tablet Take 50 mg by mouth 2 times dailyHistorical Med      primidone (MYSOLINE) 250 MG tablet Take 250 mg by mouth 2 times dailyHistorical Med      lisinopril (PRINIVIL;ZESTRIL) 10 MG tablet Take 10 mg by mouth dailyHistorical Med           Discharge Medication List as of 5/15/2021 11:48 AM            Consults:   Otolaryngology, PT, OT, ST    Hospital Course:     77 y.o. male history of hypertension, prediabetes, MI (per patient report, who presented with severe expressive aphasia and left facial droop. Last known well 7:15 PM.  He is on aspirin 81 mg daily. NIHSS of 5 as described below. Initial systolic blood pressure was 160. Blood sugar was normal limits.   Per spouse report, the patient was sitting and he was drinking alcohol when he suddenly was unresponsive, then he regained his consciousness and was not able to get the words out of his mouth. EMS was called. CT head was done in the mobile stroke unit and was unremarkable for acute changes. Stroke team decided to start TPA. In the ED, the patient was alert, has severe aphasia with NIHSS of 5. Mild left facial droop. Repeated CT head was unremarkable for acute changes CTA was unremarkable for LVO or stenosis. The patient was admitted for stroke work-up in the ICU. Patient started to have some muffled speech with shortness of breath, ED physician decided to give him Solu-Medrol, Pepcid, to rule out possible angioedema.     NIH Stroke Scale Total (if not done complete detailed one below):    1a.  Level of consciousness:  0 - alert; keenly responsive  1b. Level of consciousness questions:  0 - answers both questions correctly  1c. Level of consciousness questions:  0 - performs both tasks correctly  2. Best Gaze:  0 - normal  3. Visual:  0 - no visual loss  4. Facial Palsy:  1 - minor paralysis (flattened nasolabial fold, asymmetric on smiling)  5a. Motor left arm:  0 - no drift, limb holds 90 (or 45) degrees for full 10 seconds  5b. Motor right arm:  0 - no drift, limb holds 90 (or 45) degrees for full 10 seconds  6a. Motor left le - no drift; leg holds 30 degree position for full 5 seconds  6b. Motor right le - no drift; leg holds 30 degree position for full 5 seconds  7. Limb Ataxia:  0 - absent  8. Sensory:  0 - normal; no sensory loss  9. Best Language:  2 - severe aphasia; all communication is through fragmentary expression; great need for inference, questioning, and guessing by the listener. Range of information that can be exchanged is limited; listener carries burden of communication. Examiner cannot identify materials provided from patient response.    10.  Dysarthria:  2 - severe; patient speech is so slurred as to be unintelligible in the absence of or our of proportion to any dysphagia, or is mute/anarthric   11. Extinction and Inattention:  0 - no abnormality     Modified Viola Score Scale:      ? Zero: No symptoms at all    Symptoms resolved soon into admission, CTA with concern of right supraglottic mass measuring about 2.2 x 2.4 x 4.3 cm which appears to be locally infiltrated and extending right paraglottic space,  And surrounding the right posterior pharyngeal wall. It is also projecting   Into the airway resulting in significant airway narrowing. Follow up CT soft tissue neck: Previously seen supraglottic masslike thickening and edema, largely resolved but mild thickening of the epiglottis now present. Also, new retropharyngeal edema and new symmetric infiltration of fat within both carotid spaces. MRI brain  No acute infarct. 2d echo ef 55% negative bubble study. LDL 79, hba1c 4.8    ENT consult: bedside laryngoscopy was essentially normal and no tumor was seen and the airway was widely patent. mild non-specific edema noted on CT, not seen clinically  -no surgical intervention is warranted at this time  -he can f/u with me in a month to reassess larynx once more as outpatient    Significant Diagnostics:   As above   Disposition:   home    Patient Instructions: Activity: activity as tolerated  Diet: cardiac diet  Follow-up with neurology in 4 weeks.   Restrictions: Driving No, Swimming No, Operating heavy machinery No, Compromising heights No          Carmen Wilkinson MD,

## 2021-05-15 NOTE — PLAN OF CARE
Problem: HEMODYNAMIC STATUS  Goal: Patient has stable vital signs and fluid balance  5/15/2021 1218 by Ivan Jasso RN  Outcome: Ongoing  5/15/2021 0657 by Savi Berg RN  Outcome: Met This Shift     Problem: Falls - Risk of:  Goal: Will remain free from falls  Description: Will remain free from falls  5/15/2021 1218 by Ivan Jasso RN  Outcome: Ongoing  5/15/2021 0657 by Savi Berg RN  Outcome: Met This Shift  Goal: Absence of physical injury  Description: Absence of physical injury  5/15/2021 1218 by Ivan Jasso RN  Outcome: Ongoing  5/15/2021 0657 by Savi Berg RN  Outcome: Met This Shift

## 2021-05-15 NOTE — PROGRESS NOTES
Jaylene Menjivar is doing better today and the hoarseness is resolved    Blood pressure (!) 198/174, pulse 72, temperature 98.7 °F (37.1 °C), resp. rate 15, height 6' (1.829 m), weight 219 lb 12.8 oz (99.7 kg), SpO2 96 %. Oral- WNL, no edema of the posterior pharyngeal wall, no erythema  Neck - no masses, no induration, no pus    CT NECK     Reason for Exam: assess for mass of the airway     FINDINGS:   The lung apices are clear.  The thyroid gland enhances homogeneously.  There   is mild retropharyngeal edema, measuring up to 7.4 mm in AP thickness, of   uncertain etiology. The major salivary glands are symmetric in size and enhancement and   visualized portions of the tongue are normal.     The nasopharynx, oropharynx, and hypopharynx are grossly within normal limits. The previously seen supraglottic masslike thickening and edema has largely   resolved but there is mild thickening of the epiglottis. There is symmetric infiltration of the fat within both carotid spaces but the   major vessels of the neck enhance symmetrically.  No pathologically enlarged   lymph nodes are identified within neck by imaging criteria. There are degenerative changes of the spine. Impression:     Previously seen supraglottic masslike thickening and edema, largely resolved   but mild thickening of the epiglottis now present. Also, new retropharyngeal edema and new symmetric infiltration of fat within   both carotid spaces. Clinically correlate as to etiology and advise follow-up.       Assessment  Retropharyngeal edema on CT - nonspecific and not seen clinically  Stroke aborted by administration of thrombolytic agent  Hoarseness - resolved    Plan  -consistent with my laryngoscopy findings, there is no neoplasm in the supraglottis or larynx.  -there is mild non-specific edema noted on CT, not seen clinically  -no surgical intervention is warranted at this time  -he can f/u with me in a month to reassess larynx once more as outpatient.

## 2021-05-15 NOTE — PROGRESS NOTES
Discharge instructions reviewed with pt and wife at bedside. New orders for baby aspirin received and given prior to discharge. Follow up appts discussed.   Pt discharged to home with wife

## 2021-05-18 LAB — POC CREATININE: 1.3 MG/DL (ref 0.6–1.4)

## 2021-09-07 RX ORDER — ROSUVASTATIN CALCIUM 40 MG/1
TABLET, COATED ORAL
Qty: 30 TABLET | Refills: 3 | Status: SHIPPED | OUTPATIENT
Start: 2021-09-07 | End: 2021-09-28 | Stop reason: SDUPTHER

## 2021-09-08 RX ORDER — ROSUVASTATIN CALCIUM 40 MG/1
40 TABLET, COATED ORAL NIGHTLY
Qty: 30 TABLET | Refills: 3 | OUTPATIENT
Start: 2021-09-08

## 2021-09-14 ENCOUNTER — OFFICE VISIT (OUTPATIENT)
Dept: NEUROLOGY | Age: 66
End: 2021-09-14
Payer: MEDICARE

## 2021-09-14 VITALS — SYSTOLIC BLOOD PRESSURE: 121 MMHG | DIASTOLIC BLOOD PRESSURE: 82 MMHG | RESPIRATION RATE: 16 BRPM | HEART RATE: 52 BPM

## 2021-09-14 DIAGNOSIS — R49.8 VOICE TREMOR: ICD-10-CM

## 2021-09-14 DIAGNOSIS — R25.1 TREMOR OF BOTH HANDS: ICD-10-CM

## 2021-09-14 DIAGNOSIS — Z86.73 HISTORY OF TIA (TRANSIENT ISCHEMIC ATTACK): ICD-10-CM

## 2021-09-14 DIAGNOSIS — R56.9 SEIZURE (HCC): ICD-10-CM

## 2021-09-14 DIAGNOSIS — G25.0 BENIGN ESSENTIAL TREMOR: ICD-10-CM

## 2021-09-14 DIAGNOSIS — G25.0 BENIGN HEAD TREMOR: ICD-10-CM

## 2021-09-14 DIAGNOSIS — R55 EPISODE OF LOSS OF CONSCIOUSNESS: Primary | ICD-10-CM

## 2021-09-14 PROCEDURE — 99215 OFFICE O/P EST HI 40 MIN: CPT | Performed by: STUDENT IN AN ORGANIZED HEALTH CARE EDUCATION/TRAINING PROGRAM

## 2021-09-14 PROCEDURE — 4040F PNEUMOC VAC/ADMIN/RCVD: CPT | Performed by: STUDENT IN AN ORGANIZED HEALTH CARE EDUCATION/TRAINING PROGRAM

## 2021-09-14 PROCEDURE — G8417 CALC BMI ABV UP PARAM F/U: HCPCS | Performed by: STUDENT IN AN ORGANIZED HEALTH CARE EDUCATION/TRAINING PROGRAM

## 2021-09-14 PROCEDURE — 1123F ACP DISCUSS/DSCN MKR DOCD: CPT | Performed by: STUDENT IN AN ORGANIZED HEALTH CARE EDUCATION/TRAINING PROGRAM

## 2021-09-14 PROCEDURE — G8427 DOCREV CUR MEDS BY ELIG CLIN: HCPCS | Performed by: STUDENT IN AN ORGANIZED HEALTH CARE EDUCATION/TRAINING PROGRAM

## 2021-09-14 PROCEDURE — 3017F COLORECTAL CA SCREEN DOC REV: CPT | Performed by: STUDENT IN AN ORGANIZED HEALTH CARE EDUCATION/TRAINING PROGRAM

## 2021-09-14 PROCEDURE — 4004F PT TOBACCO SCREEN RCVD TLK: CPT | Performed by: STUDENT IN AN ORGANIZED HEALTH CARE EDUCATION/TRAINING PROGRAM

## 2021-09-14 RX ORDER — TOPIRAMATE 100 MG/1
100 TABLET, FILM COATED ORAL 2 TIMES DAILY
Qty: 60 TABLET | Refills: 3 | Status: SHIPPED | OUTPATIENT
Start: 2021-09-14

## 2021-09-14 RX ORDER — TOPIRAMATE 25 MG/1
25 TABLET ORAL 2 TIMES DAILY
Qty: 60 TABLET | Refills: 3 | Status: SHIPPED | OUTPATIENT
Start: 2021-09-14

## 2021-09-14 NOTE — PROGRESS NOTES
Chief Complaint:  LOC, Tremors      Dear Adele Felton, DO     I had the pleasure of seeing your patient today in neurology for his symptoms. As you would recall Ren Onofre is a 77 y.o. male. He was admitted to the hospital 5/13/2021 after he was brought by the mobile stroke unit as a stroke alert. At that time, he was sitting with his wife and friend. He drank 2 glasses of wine prior to having an acute episode of loss of consciousness. It was described to him as falling on his back from a sitting position and his eyes were rolled up. There was no frothing, tongue bite or loss of bowel or bladder control. There was no jerking. He did not feel any prodromal symptoms including chest pain, shortness of breath, nausea, or blurriness of vision. He doesn't know for how long he was unconscious. On evaluation in the mobile stroke unit he was noticed to have severe expressive aphasia, and mild left-sided facial droop. NIHSS of 5. CTH was unremarkable. He was admitted to the neuro ICU after receiving TPA. Lab work was remarkable for metabolic acidosis with normal anion gap. HCO3 14. CTA of the head and neck was unremarkable for LVO or significant stenosis of the head and neck. Next day, he underwent MRI of the brain without contrast did not show acute stroke. He reports that he does not remember what happened since he lost consciousness until next day when he woke up. He mentioned that his wife told him that he was combative all night. He was discharged on aspirin 81 mg daily, and Crestor 40 mg nightly. He has history of essential tremor that started 10 years ago. His mother had essential tremor as well. He was started on propranolol 7 years ago and was uptitrated to 40 mg 3 times daily which she is still on currently. He reports no significant improvement after starting propranolol. Than 5 years ago he was started on primidone that was uptitrated up to 250 mg twice daily.   He takes an extra 250 mg tablets when he is doing a dental procedure as he has a dentist.  He reported significant provement after starting primidone. Then 4 years ago he was started Topamax that he is currently taking 100 mg twice daily. 2 years ago he underwent [facility] thalam[facility] at [facility]. He reports improvement initially for 4 months followed by deterioration of his right hand essential tremor. His tremors involving his bilateral hands. 3 to 4 months ago he started noticing vocal tremor. No past medical history on file. No past surgical history on file. No Known Allergies  No family history on file. Social History     Socioeconomic History    Marital status:      Spouse name: Not on file    Number of children: Not on file    Years of education: Not on file    Highest education level: Not on file   Occupational History    Not on file   Tobacco Use    Smoking status: Not on file   Substance and Sexual Activity    Alcohol use: Not on file    Drug use: Not on file    Sexual activity: Not on file   Other Topics Concern    Not on file   Social History Narrative    Not on file     Social Determinants of Health     Financial Resource Strain:     Difficulty of Paying Living Expenses:    Food Insecurity:     Worried About Running Out of Food in the Last Year:     [address] in the Last Year:    Transportation Needs:     Lack of Transportation (Medical):      Lack of Transportation (Non-Medical):    Physical Activity:     Days of Exercise per Week:     Minutes of Exercise per Session:    Stress:     Feeling of Stress :    Social Connections:     Frequency of Communication with Friends and Family:     Frequency of Social Gatherings with Friends and Family:     Attends Mu-[employer] Services:     Active Member of Clubs or Organizations:     Attends Club or Organization Meetings:     Marital Status:    Intimate Partner Violence:     Fear of Current or Ex-Partner:     Emotionally Abused:  Physically Abused:     Sexually Abused:       /82   Pulse 52   Resp 16        General appearence: Normal. Well groomed. In no acute distress    Head: Normocephalic, atraumatic  Eyes: Extraocular movements intact, eye lids normal  Lungs: Respirations unlabored, chest wall no deformity  ENT: Normal external ear canals, no sinus tenderness  Heart: Regular rate rhythm  Abdomen: No masses, tenderness  Extremities: No cyanosis or edema, 2+ pulses  Musculoskeletal: Normal range of motion in all joints  Skin: Intact, normal skin color    Neurological examination:    Mental status   Alert and oriented; intact memory with no confusion, speech or language problems; no hallucinations or delusions     Cranial nerves   II - visual fields intact to confrontation                                                III, IV, VI - extra-ocular muscles full: no pupillary defect; no LAURA, no nystagmus, no ptosis   V - normal facial sensation                                                               VII - normal facial symmetry                                                             VIII - intact hearing                                                                             IX, X - symmetrical palate                                                                  XI - symmetrical shoulder shrug                                                       XII - midline tongue without atrophy or fasciculation     Motor function  Normal muscle bulk and tone; normal power 5/5, including fine motor movements     Sensory function Intact to touch, pin, vibration, proprioception     Cerebellar Intact fine motor movement. No involuntary movements or tremors     Reflex function Intact 2+ DTR and symmetric.  Negative Babinski     Gait                  Normal station and gait       Lab Results   Component Value Date    LDLCHOLESTEROL 79 05/14/2021     No components found for: CHLPL  Lab Results   Component Value Date    TRIG 145 05/14/2021     Lab Results   Component Value Date    HDL 63 05/14/2021     No results found for: LDLCALC  No results found for: LABVLDL  Lab Results   Component Value Date    LABA1C 4.8 05/14/2021     Lab Results   Component Value Date    EAG 91 05/14/2021     No results found for: Temitopemario Woody     All of patient's labs were personally reviewed. All the imaging studies were personally reviewed and discussed with the patient. Assessment Recommendations:     ·  Episode of loss of consciousness followed by expressive aphasia and facial droop can be explained by seizure event followed by post ictal encephalopathy. Also giving the metabolic acidosis which correlates with that. · Essential tremor       1. Routine EEG  2. Increase Topamax to 125 mg twice daily for 2 weeks then increase to 150 mg twice daily. It can help with seizure prophylaxis and reducing the essential tremor. He was counseled about possible side effects from primidone and Topamax. 3. Continue Inderal 40 mg 3 times daily  4. Obtain CMP for medication side effect monitoring   5. Phenobarbital level  6. Follow up in 2 to 3 months     Nayely Sosa,  I would like to thank you for the consult. Please do not hesitate if you have any questions about the patient care. Demetrius Kowalski MD  PGY4, Neurology       This note is created with the assistance of a speech-recognition program. While intending to generate a document that actually reflects the content of the visit, the document can still have some errors including those of syntax and sound a- like substitutions which may escape proofreading. In such instances, actual meaning can be extrapolated by contextual derivation.

## 2021-09-14 NOTE — PROGRESS NOTES
Attending Physician Statement  I have discussed the case of Curt Carter including pertinent history and exam findings with the resident/ CNP. I have seen and examined the patient and the key elements of the encounter have been performed by me. I agree with the assessment, plan and orders as documented by the resident or CNP with changes made to the note. Briefly, this is a  77 y.o. male with hx of recent hospitalization for possible stroke presents to clinic for follow-up on cerebrovascular disease. Also wants to be evaluated for ongoing essential tremors. /82   Pulse 52   Resp 16       neuro exam significant for  increased tremor on finger-nose-finger testing upon reaching the target. He also has mild tremulousness of outstretched upper extremities. He has associated mild cogwheeling, but no bradykinesia noted. He has subtle head tremor as well. tremors are also much more pronounced on keeping hands on wing-beating position. Head tremor is horizontal tremor and is also associated with mild voice tremor. Impression and Plan: Mr. Curt Carter is a 77 y.o. male with   Recent hospitalization (5/13/2021) for stroke alert with unremarkable neuro imaging studies; has had significant improvement in aphasia after IV TPA; felt to be possible TIA; discharged to home on aspirin 81 mg daily, rosuvastatin 40 mg nightly for secondary stroke prophylaxis. Likely possible differential consideration of recent event on 5/13/2021 suggestive of ictal episode; discussed with the patient; to rule out any seizure recurrence; will get EEG. Also to optimize dose of Topamax (presently taking it for tremors) as it would help for seizure prophylaxis as well.     Comorbid essential tremors (extremity tremors, Lt>Rt) with head tremor and mild voice tremor, medically refractory tremors; s/p left thalamotomy with minimal relief; will optimize the dose of Topamax from 100 bid to 125 bid for 2 wk, then 150 bid

## 2021-09-14 NOTE — PATIENT INSTRUCTIONS
EEG  Increase topamax to 125 mg twice daily for 2 weeks then increase to 150 mg twice daily   Blood work: Primidone level, CMP

## 2021-09-28 RX ORDER — ROSUVASTATIN CALCIUM 40 MG/1
40 TABLET, COATED ORAL DAILY
Qty: 90 TABLET | Refills: 2 | Status: SHIPPED | OUTPATIENT
Start: 2021-09-28

## 2022-02-09 NOTE — VIRTUAL HEALTH
[address] [facility] Stroke and Vascular Neurology Consult for  [facility] Stroke Unit Stroke Alert through [address] @ 916pm 5/13/2021 2/9/2022 2:52 PM    Pt Name: Debbie Lemus  MRN: [id number]  YOB: 1955  Date of evaluation: 2/9/2022  Primary Care Physician: Nadine Mares DO  Reason for Evaluation: Stroke Evaluation with Discussion with Ed or primary team with Telemedicine and stroke evaluation with Review of imaging and labs    71yo male with pmh of essential tremor, htn, pre dm. Pt presents with acute somnolence and mixed aphasia. At baseline pt has no focal deficits. LKn was 715pm 5/13/2021. Pt went to drink with friends. At around 754pm pt became somnolent, and was thought to be sleeping by friends. When the wife came to check on pt, he was hard to arouse, and had mixed aphasia, unable to follow commands. sbp 120, glu 135    Allergies  has No Known Allergies. Medications  Prior to Admission medications    Medication Sig Start Date End Date Taking?  Authorizing Provider   rosuvastatin (CRESTOR) 40 MG tablet Take 1 tablet by mouth daily 9/28/21   Santos Estrella MD   topiramate (TOPAMAX) 100 MG tablet Take 1 tablet by mouth 2 times daily 9/14/21   Carlos Donato MD   topiramate (TOPAMAX) 25 MG tablet Take 1 tablet by mouth 2 times daily 9/14/21   Carlos Donato MD   aspirin 81 MG chewable tablet Take 1 tablet by mouth daily 5/15/21   Santos Estrella MD   folic acid (FOLVITE) 1 MG tablet Take 1 tablet by mouth daily 5/16/21   Santos Estrella MD   rosuvastatin (CRESTOR) 40 MG tablet Take 1 tablet by mouth nightly 5/15/21   Santos Estrella MD   propranolol (INDERAL) 40 MG tablet Take 40 mg by mouth 3 times daily    Historical Provider, MD   primidone (MYSOLINE) 250 MG tablet Take 250 mg by mouth 2 times daily    Historical Provider, MD   lisinopril (PRINIVIL;ZESTRIL) 10 MG tablet Take 10 mg by mouth daily    Historical Provider, MD    Scheduled Meds:  Continuous Infusions:  PRN Meds:.  Past Medical History   has no past medical history on file. Social History  Social History     Socioeconomic History    Marital status:      Spouse name: Not on file    Number of children: Not on file    Years of education: Not on file    Highest education level: Not on file   Occupational History    Not on file   Tobacco Use    Smoking status: Not on file    Smokeless tobacco: Not on file   Substance and Sexual Activity    Alcohol use: Not on file    Drug use: Not on file    Sexual activity: Not on file   Other Topics Concern    Not on file   Social History Narrative    Not on file     Social Determinants of Health     Financial Resource Strain:     Difficulty of Paying Living Expenses: Not on file   Food Insecurity:     Worried About Running Out of Food in the Last Year: Not on file    Cam of Food in the Last Year: Not on file   Transportation Needs:     Lack of Transportation (Medical): Not on file    Lack of Transportation (Non-Medical):  Not on file   Physical Activity:     Days of Exercise per Week: Not on file    Minutes of Exercise per Session: Not on file   Stress:     Feeling of Stress : Not on file   Social Connections:     Frequency of Communication with Friends and Family: Not on file    Frequency of Social Gatherings with Friends and Family: Not on file    Attends Congregational Services: Not on file    Active Member of [address] [place] [address] [employer] or Organizations: Not on file    Attends Club or Organization Meetings: Not on file    Marital Status: Not on file   Intimate Partner Violence:     Fear of Current or Ex-Partner: Not on file    Emotionally Abused: Not on file    Physically Abused: Not on file    Sexually Abused: Not on file   Housing Stability:     Unable to Pay for Housing in the Last Year: Not on file    Number of [place] in the Last Year: Not on file    Unstable Housing in the Last Year: Not on file     Family History  No family history on essential tremor, htn, pre dm. Pt presents with acute somnolence and mixed aphasia. CT head no bleed or large hypodensity. Pt obtunded after versed. Concern for acute cortical stroke given rapid onset. Wife Lety Kerns [phone] updated on treatment and findings. Recommendations:  --tpa given. --on arrival to [facility], repeat CT head w con and check CTA head and neck w con. consider mt if there is lvo.  --sbp < 185  --bolus 1L NS ivf then continue 110 cc/hr    Discussed with Stroke team    At least 30 min of critical care time spent through telemedicine robot at patient bedside (via interactive/real-time software) as patient is in imminent and life threatening deterioration with further treatment and evaluation. This Virtual Visit was conducted with patient's (and/or legal guardian's) consent, to provide telestroke consultation and necessary medical care.   Time spent examining patient, reviewing the images personally, reviewing the chart, perform high complexity decision making and speaking with the nursing staff regarding recommendations      Emperatriz Melvin RN, MD PhD  Stroke, Neurocritical Care And/or [address] [place] [address] Stroke [address] [facility]   Electronically signed 2/9/2022 at 2:52 PM

## 2022-02-09 NOTE — ED NOTES
..    2/9/2022 2:53 PM    Patient: Wil Corral, 77 y.o., male Race:    Patient Address: [address] [address] [address] Magui Quevedo [address] [place] [address] Drive [address] [place] [address]  Incident Address:  []Same as patient address     [] [facility] [place]  [] [facility]  [x] [facility] [place]   [] BAYLEE MORAES JR. [facility]  [] [facility]  Patient Phone #: [phone]   Insurance: Payor: Eric Solis /  /  /   [facility]:  []  Yes   [x]  No  Emergency Contact: Extended Emergency Contact Information  Primary Emergency Contact: Cheli Romero 92 Phone: [phone]  Relation: Spouse  Preferred language: English   needed? No  MRN: [id number]   # [id number]  YOB: 1955  Primary Care Physician: Julia Engel DO  Advance Directive: [x] Full Code   []DNR-CC   []DNR-CCA    MSU Crew: LAINEY Britt - CT Tech, TYRELL Carpenter - Paramedic, CELIA Teixeira - HILARIO    Pt Transported To:  [x] [place] [address]  [] [facility]  [] [facility]  [] [facility]  [] [facility]  [] [facility]  [facility] [] [facility] [] [facility]    Was patient transported to closest facility? []Yes  [x]No (if No specify reason)   []   Patient/family request    [x]   Divert to specialist       Response Code   [] 2  [x] 3  []   Change  [] 2  [] 3   Transport Code   [x] 2  [] 3  []   Change  [] 2  [] 3     Mileage:  [address]   Total Miles 29.1     Call Received 2/9/2022 2035   Dispatched 2/9/2022 2035   Enroute 2/9/2022 2036   Arrived Scene 2/9/2022 2107   At Patient 2/9/2022 2110   Decision to Scan 2/9/2022 2110   Scan 2/9/2022 2127   Departed Scene 2/9/2022 2150   [facility] 2/9/2022 2229   [facility] 2/9/2022 2325   In Service 2/9/2022 2326         Allergies  [] Updated/Reviewed by MSU  [x] Historical Data Only  [] Unavailable  has No Known Allergies. Medications  [] Updated/Reviewed by MSU  [x] Historical Data Only  [] Unavailable  Prior to Admission medications    Medication Sig Start Date End Date Taking?  Authorizing Provider   rosuvastatin (CRESTOR) 40 MG tablet Take 1 tablet by mouth daily 9/28/21   Goldie Plascencia MD   topiramate (TOPAMAX) 100 MG tablet Take 1 tablet by mouth 2 times daily 9/14/21   Chris Magdaleno MD   topiramate (TOPAMAX) 25 MG tablet Take 1 tablet by mouth 2 times daily 9/14/21   Chris Magdaleno MD   aspirin 81 MG chewable tablet Take 1 tablet by mouth daily 5/15/21   Goldie Plascencia MD   folic acid (FOLVITE) 1 MG tablet Take 1 tablet by mouth daily 5/16/21   Goldie Plascencia MD   rosuvastatin (CRESTOR) 40 MG tablet Take 1 tablet by mouth nightly 5/15/21   Goldie Plascencia MD   propranolol (INDERAL) 40 MG tablet Take 40 mg by mouth 3 times daily    Historical Provider, MD   primidone (MYSOLINE) 250 MG tablet Take 250 mg by mouth 2 times daily    Historical Provider, MD   lisinopril (PRINIVIL;ZESTRIL) 10 MG tablet Take 10 mg by mouth daily    Historical Provider, MD      Past Medical History  [] Updated/Reviewed by MSU  [x] Historical Data Only  [] Unavailable   has no past medical history on file. Patient Weight: 220 lbs   [x]   Estimated   []   Stated          VITALS          Time BP Pulse   Resp  Rate N-normal  S-shallow  D-deep Monitor SpO2 O2    LPM BGL   Temp Pain   2115 122/78 (Per [facility]) 67 22 S NSR 98 0 135 37 0   2130 UTO 65 18 S NSR 98 0      2145 UTO 67 14 D NSR 99 5      2200 110/80 66 12 D NSR  97 5      2215 118/78 69 10 D NSR 98 5      2219 124/78 71 14 N NSR 99 5      2229 136/81 77 16 N NSR 98 5                                   Pupils GCS   Time R L E  4 V  5 M  6 T  15   2115 2 2 4 2 5 11   2130 4 4 4 2 5 11   2145 4 4 1 2 5 8   2200 4 4 1 2 5 8   2215 3 3 3 4 6 13   2229 3 3 3 5 6 14                                  NIH -   record on all transports and Q15 min after tPA administration                                       NIHSS       Time 2125 2353 2208 2223   1a. LOC - Arousal Status 1 2 2 0   1b. LOC - Questions 2 2 2 0   1c. LOC - Commands 2 2 2 0   2. Eye Movements (gaze) 0 0 0 0   3. Visual Fields 0 0 0 0   4. Facial Palsy 1 1 1 1   5a.  Motor Left Ar 0UTA  0UTA 0UTA 0   5b. Motor Right Arm 0UTA 0UTA 0UTA 0   6a. Motor Left Leg 0UTA 0UTA 0UTA 1   6b. Motor Right Leg 0UTA 0UTA 0UTA 1   7. Limb Ataxia 0 0 0 0   8. Sensory 0UTA 0UTA 0UTA 0   9. Language/Aphasia 3 3 3 1   10. Dysarthria 0 0 0 1   11. Neglect 0 0 0 0   TOTAL 9 10 10 5       Research:    RACE Score: 3 Time: 2135  StrokeVAN Score: positive Time: 2135    Time Last Known Well: 1915    Narrative:    Dispatched to possible CVA per LF. Arrived to find Maryam Turner, 77 y.o., male c/o sudden onset stroke like symptoms. Report received from [facility] at patients side. Per EMS, Patient TENISHA Barnett 115 was 0145 per family and was found outside sitting in a chair and was unresponsive. Upon their arrival, patient was globally aphasic, with a facial droop and became combative on the way to meet with MSU. Upon patient arrival, patient was found to be awake but unable to speak. Patient not following commands at that time. Patient could occasionally get one intelligible word out. Patient noted to have mild left facial droop. Patient arms very spastic and drawn up towards his chest, while occasionally he could pull his arm up in an attempt to swing upwards. Decision made to scan. Patient increasingly agitated, making incomprehensible noises, so the decision to give IV versed was made. Initial dose of 2 mg given IV at 2125, with second dose given at 2130 in attempt to scan. Patient able to be scanned after.  at patient side via telemedicine unit. Patient became somulent at this time so 5L NC was applied along with the initiation of Co2 monitoring. While assessing, it was noted that left arm and leg had lesser response to deep pain then the right. Due to negative CT scan and patient presentation, tPa was initiated per Dr. Brain Cm. Patient received bolus dose at 2153, pushed by [facility], Paramedic. Drip then was initiated at 2154 by Alfonso Shaw. While en rout to [facility], patient began to become more arousable.  Upon further assessment, about 20 minutes after the initiation of tPa, patient was able to respond to commands, give his name, wife's name, and his age. Patient also able to give a thumbs up and move all extremities. Patient received the following medications prior to MSU arrival:None  Patient has the following lines prior to MSU arrival:[facility]  Patient has the following airway placed prior to MSU arrival: None    Patient was transferred to cot via 2 person assist and secured with straps x3. Zoll ECG, SpO2, and NIBP applied and monitored throughout encounter. HOB maintained at 30 degrees. Patient was transferred to ambulance, cot secured in scan position. Non contrast CT scan performed. After scan cot secured in transport position. IV tPA inclusion/exclusion criteria reviewed with patient and physician. Candidate for IV tPA therapy? [x] Yes   [] No - due to the following exclusion criteria:    [] ICH   [] Taking anticoagulant -   [] Beyond last time known well window  [] At or returned to baseline   [] Marked improvement of symptoms  [] No disabling symptoms  [] Other -     Patient [facility] . During transport patient rests comfortably. Cabin temp maintained at 70-72 °F throughout transport. Patient was transferred to bed 13 via 4 person sheet lift. . Patient care handoff completed with Monica Morgan RN at bedside. Imaging:  Images were obtained onboard the MSU including:  [x] CT brain without contrast - see results below:      Ct Head Wo Contrast    Result Date: 5/13/2021  EXAMINATION: CT OF THE HEAD WITHOUT CONTRAST  5/13/2021 11:03 pm TECHNIQUE: CT of the head was performed without the administration of intravenous contrast. Dose modulation, iterative reconstruction, and/or weight based adjustment of the mA/kV was utilized to reduce the radiation dose to as low as reasonably achievable.  COMPARISON: Head CT 05/13/2021 HISTORY: ORDERING SYSTEM PROVIDED HISTORY: r/o stroke, s/p TPA TECHNOLOGIST PROVIDED HISTORY: r/o stroke, s/p TPA Decision Support Exception - unselect if not a suspected or confirmed emergency medical condition->Emergency Medical Condition (MA) Reason for Exam: stroke alert, post tpa Acuity: Acute Type of Exam: Initial FINDINGS: BRAIN/VENTRICLES: There is no acute intracranial hemorrhage, mass effect or midline shift. No abnormal extra-axial fluid collection. The gray-white differentiation is maintained without evidence of an acute infarct. There is no evidence of hydrocephalus. Intracranial vascular calcifications. ORBITS: The visualized portion of the orbits demonstrate no acute abnormality. SINUSES: The visualized paranasal sinuses and mastoid air cells demonstrate no acute abnormality. SOFT TISSUES/SKULL:  No acute abnormality of the visualized skull or soft tissues. No acute intracranial abnormality. No hemorrhagic conversion status post tPA. Ct Head Wo Contrast    Result Date: 5/13/2021  EXAMINATION: CT OF THE HEAD WITHOUT CONTRAST  5/13/2021 9:17 pm TECHNIQUE: CT of the head was performed without the administration of intravenous contrast. Dose modulation, iterative reconstruction, and/or weight based adjustment of the mA/kV was utilized to reduce the radiation dose to as low as reasonably achievable. COMPARISON: None. HISTORY: ORDERING SYSTEM PROVIDED HISTORY: Stroke TECHNOLOGIST PROVIDED HISTORY: Stroke Decision Support Exception - unselect if not a suspected or confirmed emergency medical condition->Emergency Medical Condition (MA) FINDINGS: Exam performed on a mobile/portable CT scan. Only axial images are available for review. BRAIN/VENTRICLES: Motion and attenuation artifact challenge evaluation. There is no acute intracranial hemorrhage, mass effect or midline shift. No abnormal extra-axial fluid collection. The gray-white differentiation is grossly maintained without evidence of an acute infarct. There is no evidence of hydrocephalus.  ORBITS: The visualized portion of the orbits demonstrate no acute abnormality. SINUSES: The visualized paranasal sinuses and mastoid air cells demonstrate no acute abnormality. SOFT TISSUES/SKULL:  No acute abnormality of the visualized skull or soft tissues. No acute bleed or midline shift. The findings were sent to [facility] electronically at 9:47 pm on 5/13/2021       Physical assessment performed:    Neuro: Initially globally aphasic, unable to follow commands, upon patient leaving MSU, patient able to follow commands and was alert and oriented x2. Able to move arms and legs equally. Resp: lungs clear to auscultation bilaterally, normal effort. 5 L NC applied, co2 noted to maintain around 30-35. Cardiac: regular rate, no murmur, 12 lead ECG shows NSR  Muscular/skeletal/peripheral vascular: no edema, no redness or tenderness in the calves, pulses present in 4 extremities   Abd/GI/: abd flat, soft, non tender. Skin: normal color, warm, dry      IV LINES   Time Size Site # Attempts Performed By   Kip Resendez 20 LAC UNK [place]   2155 20 RAC 1 TYRELL Carpenter, Medic              Total Amount of IV Fluids Infused: 200    MEDS / ELECTRICAL RX   Time Therapy Dosage Route Response Performed By   2125 Versed 2 IV No response CELIA Teixeira RN   2130 Versed 3 IV Decreased agitation CELIA Teixeira RN                                       TPA Administration   Time Bolus Dose Infusion Dose   2153 9      81       [x] tPA dosing double checked by:  TYRELL Carpenter, Paramedic       ACUTE STROKE DYSPHAGIA SCREEN              YES NO   1 GCS less than 13?         2 Facial Asymmetry or Weakness? 3 Tongue Asymmetry or Weakness? 4 Palatal Asymmetry or Weakness?        5 Signs of aspiration during 3oz water test?*                 If answers to questions 1-4 are NO proceed to 3oz water test               *Administer 3oz of water for sequential drinks, note any throat clearing, cough, or change in vocal quality immediately after and 1 minute following the swallow.                If answers to questions 1-5 are NO proceed with ordered PO meds       POC LABS      TIME      Test (reference range)      FSBG ()      PT (11-13.5)      INR (0.8-1.1)      Na (138-146)      K (3.5-4.9)      Cl ()      iCa (1.2-1.32)      TCO2 (24-29)      Glu () 135     BUN (8.0-26)      Crea (0.6-1.3) 1.3     Hct (38-51)      Hb (12.0-17)      AnGap 10.0-20)                Assistance:   []    Fire -     []    Police    [x]    Other EMS (See Below)    [facility] Notification:    Angelito Coleman 15 -  [x] [facility]  [] [facility]  [] [facility]  [] [facility]  [] [facility] [] [facility] [] [facility] [] [facility]  [] [employer]     [x]    Med Channel:     []    Cell Phone     Med Control Orders Received:   [] No  [x]  Yes:     Med Control Physician (if on line medical direction received)  -        Hospital Team Alert:   []    Trauma Alert    []    STEMI Alert         []    Stroke Alert    [x]    RACE Alert      Description Of Valuables: Shirts, shoes, jeans    Patient Valuables:   [x]    With Patient    []    Not Received    [x]    ER / Lab     Call Outcome:   [x]    Transport to Facility    []    Care Transferred to [facility]    []    Cancelled    []    Patient Refusal    []                           Mobile Stroke Unit    Electronically signed by Raquel Garcia RN on 5/14/21 at 12:06 AM EDT       Raquel Garcia RN  05/14/21 0202

## 2025-03-05 ENCOUNTER — APPOINTMENT (OUTPATIENT)
Dept: NEUROLOGY | Facility: CLINIC | Age: 70
End: 2025-03-05
Payer: MEDICARE